# Patient Record
Sex: MALE | Race: WHITE | URBAN - METROPOLITAN AREA
[De-identification: names, ages, dates, MRNs, and addresses within clinical notes are randomized per-mention and may not be internally consistent; named-entity substitution may affect disease eponyms.]

---

## 2018-12-20 ENCOUNTER — OFFICE VISIT (OUTPATIENT)
Dept: FAMILY MEDICINE CLINIC | Age: 28
End: 2018-12-20
Payer: COMMERCIAL

## 2018-12-20 ENCOUNTER — HOSPITAL ENCOUNTER (OUTPATIENT)
Age: 28
Setting detail: SPECIMEN
Discharge: HOME | End: 2018-12-20
Payer: COMMERCIAL

## 2018-12-20 VITALS
HEIGHT: 70 IN | WEIGHT: 190 LBS | OXYGEN SATURATION: 98 % | BODY MASS INDEX: 27.2 KG/M2 | SYSTOLIC BLOOD PRESSURE: 120 MMHG | HEART RATE: 89 BPM | DIASTOLIC BLOOD PRESSURE: 98 MMHG

## 2018-12-20 DIAGNOSIS — Z13.220 SCREENING FOR LIPID DISORDERS: ICD-10-CM

## 2018-12-20 DIAGNOSIS — Z51.81 MEDICATION MONITORING ENCOUNTER: ICD-10-CM

## 2018-12-20 DIAGNOSIS — Z00.00 ROUTINE GENERAL MEDICAL EXAMINATION AT A HEALTH CARE FACILITY: ICD-10-CM

## 2018-12-20 DIAGNOSIS — Z00.00 ROUTINE GENERAL MEDICAL EXAMINATION AT A HEALTH CARE FACILITY: Primary | ICD-10-CM

## 2018-12-20 LAB
ALBUMIN SERPL-MCNC: 4.5 G/DL (ref 3.5–5.2)
ALBUMIN/GLOBULIN RATIO: 1.4 (ref 1–2.5)
ALP BLD-CCNC: 77 U/L (ref 40–129)
ALT SERPL-CCNC: 40 U/L (ref 5–41)
ANION GAP SERPL CALCULATED.3IONS-SCNC: 15 MMOL/L (ref 9–17)
AST SERPL-CCNC: 23 U/L
BILIRUB SERPL-MCNC: 0.57 MG/DL (ref 0.3–1.2)
BUN BLDV-MCNC: 16 MG/DL (ref 6–20)
BUN/CREAT BLD: NORMAL (ref 9–20)
CALCIUM SERPL-MCNC: 9.9 MG/DL (ref 8.6–10.4)
CHLORIDE BLD-SCNC: 100 MMOL/L (ref 98–107)
CHOLESTEROL/HDL RATIO: 4.5
CHOLESTEROL: 286 MG/DL
CO2: 26 MMOL/L (ref 20–31)
CREAT SERPL-MCNC: 0.98 MG/DL (ref 0.7–1.2)
GFR AFRICAN AMERICAN: >60 ML/MIN
GFR NON-AFRICAN AMERICAN: >60 ML/MIN
GFR SERPL CREATININE-BSD FRML MDRD: NORMAL ML/MIN/{1.73_M2}
GFR SERPL CREATININE-BSD FRML MDRD: NORMAL ML/MIN/{1.73_M2}
GLUCOSE BLD-MCNC: 79 MG/DL (ref 70–99)
HDLC SERPL-MCNC: 64 MG/DL
LDL CHOLESTEROL: 168 MG/DL (ref 0–130)
POTASSIUM SERPL-SCNC: 4 MMOL/L (ref 3.7–5.3)
SODIUM BLD-SCNC: 141 MMOL/L (ref 135–144)
TOTAL PROTEIN: 7.7 G/DL (ref 6.4–8.3)
TRIGL SERPL-MCNC: 272 MG/DL
VLDLC SERPL CALC-MCNC: ABNORMAL MG/DL (ref 1–30)

## 2018-12-20 PROCEDURE — 99385 PREV VISIT NEW AGE 18-39: CPT | Performed by: FAMILY MEDICINE

## 2018-12-20 PROCEDURE — 90756 CCIIV4 VACC ABX FREE IM: CPT | Performed by: FAMILY MEDICINE

## 2018-12-20 PROCEDURE — 90471 IMMUNIZATION ADMIN: CPT | Performed by: FAMILY MEDICINE

## 2018-12-20 RX ORDER — DOXYCYCLINE HYCLATE 100 MG
TABLET ORAL
Qty: 100 TABLET | Refills: 3 | Status: SHIPPED | OUTPATIENT
Start: 2018-12-20 | End: 2021-06-29

## 2018-12-20 ASSESSMENT — PATIENT HEALTH QUESTIONNAIRE - PHQ9
SUM OF ALL RESPONSES TO PHQ9 QUESTIONS 1 & 2: 0
2. FEELING DOWN, DEPRESSED OR HOPELESS: 0
SUM OF ALL RESPONSES TO PHQ QUESTIONS 1-9: 0
SUM OF ALL RESPONSES TO PHQ QUESTIONS 1-9: 0
1. LITTLE INTEREST OR PLEASURE IN DOING THINGS: 0

## 2018-12-20 ASSESSMENT — ENCOUNTER SYMPTOMS
RHINORRHEA: 0
NAUSEA: 0
PHOTOPHOBIA: 0
APNEA: 0
COLOR CHANGE: 0
EYE PAIN: 0
EYE DISCHARGE: 0
VOMITING: 0
CHEST TIGHTNESS: 0
EYE ITCHING: 0
BLOOD IN STOOL: 0
BACK PAIN: 0
SHORTNESS OF BREATH: 0
SORE THROAT: 0
STRIDOR: 0
EYE REDNESS: 0
SINUS PRESSURE: 0
ABDOMINAL DISTENTION: 0
ABDOMINAL PAIN: 0
COUGH: 0
WHEEZING: 0
DIARRHEA: 0
CHOKING: 0
CONSTIPATION: 0

## 2018-12-20 NOTE — PROGRESS NOTES
Panel; Future  - Lipid Panel; Future    2. Screening for lipid disorders  - Lipid Panel; Future    3. Medication monitoring encounter  - Comprehensive Metabolic Panel;  Future

## 2019-10-22 ENCOUNTER — IMMUNIZATION (OUTPATIENT)
Dept: FAMILY MEDICINE CLINIC | Age: 29
End: 2019-10-22
Payer: COMMERCIAL

## 2019-10-22 DIAGNOSIS — Z23 IMMUNIZATION DUE: Primary | ICD-10-CM

## 2019-10-22 PROCEDURE — 90471 IMMUNIZATION ADMIN: CPT | Performed by: NURSE PRACTITIONER

## 2019-10-22 PROCEDURE — 90688 IIV4 VACCINE SPLT 0.5 ML IM: CPT | Performed by: NURSE PRACTITIONER

## 2020-01-10 ENCOUNTER — OFFICE VISIT (OUTPATIENT)
Dept: FAMILY MEDICINE CLINIC | Age: 30
End: 2020-01-10
Payer: COMMERCIAL

## 2020-01-10 ENCOUNTER — HOSPITAL ENCOUNTER (OUTPATIENT)
Age: 30
Setting detail: SPECIMEN
Discharge: HOME | End: 2020-01-10
Payer: COMMERCIAL

## 2020-01-10 VITALS
HEIGHT: 69 IN | WEIGHT: 190.6 LBS | SYSTOLIC BLOOD PRESSURE: 126 MMHG | HEART RATE: 98 BPM | DIASTOLIC BLOOD PRESSURE: 82 MMHG | BODY MASS INDEX: 28.23 KG/M2 | OXYGEN SATURATION: 100 %

## 2020-01-10 LAB
ABSOLUTE EOS #: 0.12 K/UL (ref 0–0.44)
ABSOLUTE IMMATURE GRANULOCYTE: <0.03 K/UL (ref 0–0.3)
ABSOLUTE LYMPH #: 1.41 K/UL (ref 1.1–3.7)
ABSOLUTE MONO #: 0.41 K/UL (ref 0.1–1.2)
ALBUMIN SERPL-MCNC: 4.6 G/DL (ref 3.5–5.2)
ALT SERPL-CCNC: 40 U/L (ref 5–41)
ANION GAP SERPL CALCULATED.3IONS-SCNC: 15 MMOL/L (ref 9–17)
AST SERPL-CCNC: 22 U/L
BASOPHILS # BLD: 0 % (ref 0–2)
BASOPHILS ABSOLUTE: <0.03 K/UL (ref 0–0.2)
BUN BLDV-MCNC: 16 MG/DL (ref 6–20)
BUN/CREAT BLD: NORMAL (ref 9–20)
CALCIUM SERPL-MCNC: 10.2 MG/DL (ref 8.6–10.4)
CHLORIDE BLD-SCNC: 103 MMOL/L (ref 98–107)
CHOLESTEROL/HDL RATIO: 5
CHOLESTEROL: 289 MG/DL
CO2: 25 MMOL/L (ref 20–31)
CREAT SERPL-MCNC: 1.18 MG/DL (ref 0.7–1.2)
DIFFERENTIAL TYPE: NORMAL
EOSINOPHILS RELATIVE PERCENT: 3 % (ref 1–4)
GFR AFRICAN AMERICAN: >60 ML/MIN
GFR NON-AFRICAN AMERICAN: >60 ML/MIN
GFR SERPL CREATININE-BSD FRML MDRD: NORMAL ML/MIN/{1.73_M2}
GFR SERPL CREATININE-BSD FRML MDRD: NORMAL ML/MIN/{1.73_M2}
GLUCOSE BLD-MCNC: 73 MG/DL (ref 70–99)
HCT VFR BLD CALC: 47.9 % (ref 40.7–50.3)
HDLC SERPL-MCNC: 58 MG/DL
HEMOGLOBIN: 16 G/DL (ref 13–17)
HIV AG/AB: NONREACTIVE
IMMATURE GRANULOCYTES: 0 %
LDL CHOLESTEROL: 207 MG/DL (ref 0–130)
LYMPHOCYTES # BLD: 29 % (ref 24–43)
MAGNESIUM: 2.4 MG/DL (ref 1.6–2.6)
MCH RBC QN AUTO: 30.2 PG (ref 25.2–33.5)
MCHC RBC AUTO-ENTMCNC: 33.4 G/DL (ref 28.4–34.8)
MCV RBC AUTO: 90.4 FL (ref 82.6–102.9)
MONOCYTES # BLD: 8 % (ref 3–12)
NRBC AUTOMATED: 0 PER 100 WBC
PDW BLD-RTO: 12.6 % (ref 11.8–14.4)
PHOSPHORUS: 3.4 MG/DL (ref 2.5–4.5)
PLATELET # BLD: 219 K/UL (ref 138–453)
PLATELET ESTIMATE: NORMAL
PMV BLD AUTO: 11 FL (ref 8.1–13.5)
POTASSIUM SERPL-SCNC: 4.5 MMOL/L (ref 3.7–5.3)
RBC # BLD: 5.3 M/UL (ref 4.21–5.77)
RBC # BLD: NORMAL 10*6/UL
SEG NEUTROPHILS: 60 % (ref 36–65)
SEGMENTED NEUTROPHILS ABSOLUTE COUNT: 2.91 K/UL (ref 1.5–8.1)
SODIUM BLD-SCNC: 143 MMOL/L (ref 135–144)
TRIGL SERPL-MCNC: 122 MG/DL
TSH SERPL DL<=0.05 MIU/L-ACNC: 2.02 MIU/L (ref 0.3–5)
VLDLC SERPL CALC-MCNC: ABNORMAL MG/DL (ref 1–30)
WBC # BLD: 4.9 K/UL (ref 3.5–11.3)
WBC # BLD: NORMAL 10*3/UL

## 2020-01-10 PROCEDURE — 99395 PREV VISIT EST AGE 18-39: CPT | Performed by: FAMILY MEDICINE

## 2020-01-10 RX ORDER — OMEPRAZOLE 20 MG/1
20 CAPSULE, DELAYED RELEASE ORAL 2 TIMES DAILY
Qty: 60 CAPSULE | Refills: 2 | Status: SHIPPED | OUTPATIENT
Start: 2020-01-10 | End: 2021-06-29

## 2020-01-10 SDOH — ECONOMIC STABILITY: TRANSPORTATION INSECURITY
IN THE PAST 12 MONTHS, HAS THE LACK OF TRANSPORTATION KEPT YOU FROM MEDICAL APPOINTMENTS OR FROM GETTING MEDICATIONS?: NO

## 2020-01-10 SDOH — ECONOMIC STABILITY: FOOD INSECURITY: WITHIN THE PAST 12 MONTHS, YOU WORRIED THAT YOUR FOOD WOULD RUN OUT BEFORE YOU GOT MONEY TO BUY MORE.: NEVER TRUE

## 2020-01-10 SDOH — ECONOMIC STABILITY: INCOME INSECURITY: HOW HARD IS IT FOR YOU TO PAY FOR THE VERY BASICS LIKE FOOD, HOUSING, MEDICAL CARE, AND HEATING?: NOT HARD AT ALL

## 2020-01-10 SDOH — ECONOMIC STABILITY: FOOD INSECURITY: WITHIN THE PAST 12 MONTHS, THE FOOD YOU BOUGHT JUST DIDN'T LAST AND YOU DIDN'T HAVE MONEY TO GET MORE.: NEVER TRUE

## 2020-01-10 SDOH — ECONOMIC STABILITY: TRANSPORTATION INSECURITY
IN THE PAST 12 MONTHS, HAS LACK OF TRANSPORTATION KEPT YOU FROM MEETINGS, WORK, OR FROM GETTING THINGS NEEDED FOR DAILY LIVING?: NO

## 2020-01-10 ASSESSMENT — PATIENT HEALTH QUESTIONNAIRE - PHQ9
2. FEELING DOWN, DEPRESSED OR HOPELESS: 0
1. LITTLE INTEREST OR PLEASURE IN DOING THINGS: 0
SUM OF ALL RESPONSES TO PHQ QUESTIONS 1-9: 0
SUM OF ALL RESPONSES TO PHQ9 QUESTIONS 1 & 2: 0
SUM OF ALL RESPONSES TO PHQ QUESTIONS 1-9: 0

## 2020-01-10 NOTE — PROGRESS NOTES
Physical Examination:     GENERAL APPEARANCE: in no acute distress, well developed, well nourished. HEAD: normocephalic, atraumatic. EYES: extraocular movement intact (EOMI), pupils equal, round, reactive to light and accommodation. EARS: normal, tympanic membrane intact, clear, auditory canal clear. NOSE: nares patent, no erythema, sinuses nontender bilaterally, no rhinorrhea. ORAL CAVITY: mucosa moist, no lesions. THROAT: clear, no mass, no exudate. NECK/THYROID: neck supple, full range of motion, no thyromegaly. HEART: no murmurs, regular rate and rhythm, S1, S2 normal.   LUNGS: clear to auscultation bilaterally, no wheezes, rales, rhonchi.    ABDOMEN: normal, bowel sounds present, soft, nontender, nondistended, no rebound guarding or rigidity    Recent Labs/ In Office Testing/ Radiograph review:     Hospital Outpatient Visit on 12/20/2018   Component Date Value Ref Range Status    Glucose 12/20/2018 79  70 - 99 mg/dL Final    BUN 12/20/2018 16  6 - 20 mg/dL Final    CREATININE 12/20/2018 0.98  0.70 - 1.20 mg/dL Final    Bun/Cre Ratio 12/20/2018 NOT REPORTED  9 - 20 Final    Calcium 12/20/2018 9.9  8.6 - 10.4 mg/dL Final    Sodium 12/20/2018 141  135 - 144 mmol/L Final    Potassium 12/20/2018 4.0  3.7 - 5.3 mmol/L Final    Chloride 12/20/2018 100  98 - 107 mmol/L Final    CO2 12/20/2018 26  20 - 31 mmol/L Final    Anion Gap 12/20/2018 15  9 - 17 mmol/L Final    Alkaline Phosphatase 12/20/2018 77  40 - 129 U/L Final    ALT 12/20/2018 40  5 - 41 U/L Final    AST 12/20/2018 23  <40 U/L Final    Total Bilirubin 12/20/2018 0.57  0.3 - 1.2 mg/dL Final    Total Protein 12/20/2018 7.7  6.4 - 8.3 g/dL Final    Alb 12/20/2018 4.5  3.5 - 5.2 g/dL Final    Albumin/Globulin Ratio 12/20/2018 1.4  1.0 - 2.5 Final    GFR Non- 12/20/2018 >60  >60 mL/min Final    GFR  12/20/2018 >60  >60 mL/min Final    GFR Comment 12/20/2018        Final    Comment: Average GFR for 30-36 years old:   80 mL/min/1.73sq m  Chronic Kidney Disease:   <60 mL/min/1.73sq m  Kidney failure:   <15 mL/min/1.73sq m              eGFR calculated using average adult body mass. Additional eGFR calculator   available at:        [email or web address]            GFR Staging 12/20/2018 NOT REPORTED   Final    Cholesterol 12/20/2018 286* <200 mg/dL Final    Comment:    Cholesterol Guidelines:      <200  Desirable   200-240  Borderline      >240  Undesirable         HDL 12/20/2018 64  >40 mg/dL Final    Comment:    HDL Guidelines:    <40     Undesirable   40-59    Borderline    >59     Desirable         LDL Cholesterol 12/20/2018 168* 0 - 130 mg/dL Final    Comment:    LDL Guidelines:     <100    Desirable   100-129   Near to/above Desirable   130-159   Borderline      >159   Undesirable     Direct (measured) LDL and calculated LDL are not interchangeable tests.  Chol/HDL Ratio 12/20/2018 4.5  <5 Final            Triglycerides 12/20/2018 272* <150 mg/dL Final    Comment:    Triglyceride Guidelines:     <150   Desirable   150-199  Borderline   200-499  High     >499   Very high   Based on AHA Guidelines for fasting triglyceride, October 2012.  VLDL 12/20/2018 NOT REPORTED  1 - 30 mg/dL Final       No results found for this visit on 01/10/20. Assessment/Plan:     Cesar Azul was seen today for establish care and discuss labs. Diagnoses and all orders for this visit:    Well adult exam  -     ALT; Future  -     AST; Future  -     CBC Auto Differential; Future    BMI 27.0-27.9,adult    Exercise counseling    Dietary counseling    Multiphasic screening  -     Cancel: HIV Screen; Future    HTN, goal below 130/80  -     Magnesium; Future  -     Renal Function Panel; Future    Dyslipidemia  -     Lipid Panel; Future  -     TSH with Reflex;  Future    Hyperglycemia  -     Hemoglobin A1C; Future    Gastroesophageal reflux disease without esophagitis  -     omeprazole

## 2020-01-12 LAB
ESTIMATED AVERAGE GLUCOSE: 103 MG/DL
HBA1C MFR BLD: 5.2 % (ref 4–6)

## 2020-01-13 ENCOUNTER — TELEPHONE (OUTPATIENT)
Dept: FAMILY MEDICINE CLINIC | Age: 30
End: 2020-01-13

## 2020-01-13 LAB — H PYLORI BREATH TEST: NEGATIVE

## 2020-01-13 RX ORDER — ROSUVASTATIN CALCIUM 5 MG/1
5 TABLET, COATED ORAL NIGHTLY
Qty: 90 TABLET | Refills: 0 | Status: SHIPPED | OUTPATIENT
Start: 2020-01-13 | End: 2020-04-28 | Stop reason: SDUPTHER

## 2020-04-28 ENCOUNTER — PATIENT MESSAGE (OUTPATIENT)
Dept: FAMILY MEDICINE CLINIC | Age: 30
End: 2020-04-28

## 2020-04-28 RX ORDER — ROSUVASTATIN CALCIUM 5 MG
5 TABLET ORAL NIGHTLY
Qty: 90 TABLET | Refills: 0 | Status: SHIPPED | OUTPATIENT
Start: 2020-04-28 | End: 2020-06-03 | Stop reason: SDUPTHER

## 2020-06-03 ENCOUNTER — PATIENT MESSAGE (OUTPATIENT)
Dept: FAMILY MEDICINE CLINIC | Age: 30
End: 2020-06-03

## 2020-06-03 RX ORDER — ROSUVASTATIN CALCIUM 5 MG
5 TABLET ORAL NIGHTLY
Qty: 90 TABLET | Refills: 1 | Status: SHIPPED | OUTPATIENT
Start: 2020-06-03 | End: 2021-01-11 | Stop reason: SDUPTHER

## 2020-06-25 ENCOUNTER — HOSPITAL ENCOUNTER (OUTPATIENT)
Age: 30
Setting detail: SPECIMEN
Discharge: HOME | End: 2020-06-25
Payer: COMMERCIAL

## 2020-06-25 LAB
CHOLESTEROL/HDL RATIO: 3.2
CHOLESTEROL: 193 MG/DL
HDLC SERPL-MCNC: 60 MG/DL
LDL CHOLESTEROL: 110 MG/DL (ref 0–130)
TRIGL SERPL-MCNC: 115 MG/DL
VLDLC SERPL CALC-MCNC: NORMAL MG/DL (ref 1–30)

## 2020-09-29 ENCOUNTER — IMMUNIZATION (OUTPATIENT)
Dept: FAMILY MEDICINE CLINIC | Age: 30
End: 2020-09-29
Payer: COMMERCIAL

## 2020-09-29 PROCEDURE — 90471 IMMUNIZATION ADMIN: CPT | Performed by: FAMILY MEDICINE

## 2020-09-29 PROCEDURE — 90686 IIV4 VACC NO PRSV 0.5 ML IM: CPT | Performed by: FAMILY MEDICINE

## 2021-01-11 DIAGNOSIS — E78.5 DYSLIPIDEMIA: ICD-10-CM

## 2021-01-11 RX ORDER — ROSUVASTATIN CALCIUM 5 MG
5 TABLET ORAL NIGHTLY
Qty: 90 TABLET | Refills: 1 | Status: SHIPPED | OUTPATIENT
Start: 2021-01-11 | End: 2021-06-29 | Stop reason: SDUPTHER

## 2021-01-11 NOTE — TELEPHONE ENCOUNTER
Jak Hines is calling to request a refill on the following medication(s):    Medication Request:  Requested Prescriptions     Pending Prescriptions Disp Refills    CRESTOR 5 MG tablet 90 tablet 1     Sig: Take 1 tablet by mouth nightly       Last Visit Date (If Applicable):  6/88/6362    Next Visit Date:    Visit date not found

## 2021-03-31 ENCOUNTER — IMMUNIZATION (OUTPATIENT)
Dept: FAMILY MEDICINE CLINIC | Age: 31
End: 2021-03-31
Payer: COMMERCIAL

## 2021-03-31 PROCEDURE — 91300 COVID-19, PFIZER VACCINE 30MCG/0.3ML DOSE: CPT | Performed by: INTERNAL MEDICINE

## 2021-03-31 PROCEDURE — 0001A COVID-19, PFIZER VACCINE 30MCG/0.3ML DOSE: CPT | Performed by: INTERNAL MEDICINE

## 2021-04-21 ENCOUNTER — IMMUNIZATION (OUTPATIENT)
Dept: FAMILY MEDICINE CLINIC | Age: 31
End: 2021-04-21
Payer: COMMERCIAL

## 2021-04-21 PROCEDURE — 91300 COVID-19, PFIZER VACCINE 30MCG/0.3ML DOSE: CPT | Performed by: INTERNAL MEDICINE

## 2021-04-21 PROCEDURE — 0002A COVID-19, PFIZER VACCINE 30MCG/0.3ML DOSE: CPT | Performed by: INTERNAL MEDICINE

## 2021-06-29 ENCOUNTER — OFFICE VISIT (OUTPATIENT)
Dept: FAMILY MEDICINE CLINIC | Age: 31
End: 2021-06-29
Payer: COMMERCIAL

## 2021-06-29 VITALS
TEMPERATURE: 97.4 F | BODY MASS INDEX: 27.16 KG/M2 | HEART RATE: 91 BPM | OXYGEN SATURATION: 98 % | SYSTOLIC BLOOD PRESSURE: 110 MMHG | HEIGHT: 71 IN | DIASTOLIC BLOOD PRESSURE: 70 MMHG | WEIGHT: 194 LBS

## 2021-06-29 DIAGNOSIS — E78.5 DYSLIPIDEMIA: ICD-10-CM

## 2021-06-29 DIAGNOSIS — Z71.3 DIETARY COUNSELING: ICD-10-CM

## 2021-06-29 DIAGNOSIS — R73.9 HYPERGLYCEMIA: ICD-10-CM

## 2021-06-29 DIAGNOSIS — Z00.00 WELL ADULT EXAM: Primary | ICD-10-CM

## 2021-06-29 DIAGNOSIS — Z71.82 EXERCISE COUNSELING: ICD-10-CM

## 2021-06-29 PROCEDURE — 99395 PREV VISIT EST AGE 18-39: CPT | Performed by: FAMILY MEDICINE

## 2021-06-29 PROCEDURE — G8417 CALC BMI ABV UP PARAM F/U: HCPCS | Performed by: FAMILY MEDICINE

## 2021-06-29 RX ORDER — ROSUVASTATIN CALCIUM 5 MG
5 TABLET ORAL NIGHTLY
Qty: 90 TABLET | Refills: 3 | Status: SHIPPED | OUTPATIENT
Start: 2021-06-29 | End: 2021-07-28 | Stop reason: SDUPTHER

## 2021-06-29 ASSESSMENT — PATIENT HEALTH QUESTIONNAIRE - PHQ9
SUM OF ALL RESPONSES TO PHQ QUESTIONS 1-9: 0
SUM OF ALL RESPONSES TO PHQ QUESTIONS 1-9: 0
SUM OF ALL RESPONSES TO PHQ9 QUESTIONS 1 & 2: 0
1. LITTLE INTEREST OR PLEASURE IN DOING THINGS: 0
SUM OF ALL RESPONSES TO PHQ QUESTIONS 1-9: 0
2. FEELING DOWN, DEPRESSED OR HOPELESS: 0

## 2021-06-29 NOTE — PATIENT INSTRUCTIONS
Learning About Obesity  What is obesity? Obesity means having an unhealthy amount of body fat. This puts your health in danger. It can lead to other health problems, such as type 2 diabetes and high blood pressure. How do you know if your weight is in the obesity range? To know if your weight is in the obesity range, your doctor looks at your body mass index (BMI) and waist size. BMI is a number that is calculated from your weight and your height. To figure out your BMI for yourself, you can use an online tool, such as http://www.ace.com/ on the [employer]. If your BMI is 30.0 or higher, it falls within the obesity range. Keep in mind that BMI and waist size are only guides. They are not tools to determine your ideal body weight. What causes obesity? When you take in more calories than you burn off, you gain weight. How you eat, how active you are, and other things affect how your body uses calories and whether you gain weight. If you have family members who have too much body fat, you may have inherited a tendency to gain weight. And your family also helps form your eating and lifestyle habits, which can lead to obesity. Also, our busy lives make it harder to plan and cook healthy meals. For many of us, it's easier to reach for prepared foods, go out to eat, or go to the drive-through. But these foods are often high in saturated fat and calories. Portions are often too large. What can you do to reach a healthy weight? Focus on health, not diets. Diets are hard to stay on and don't work in the long run. It is very hard to stay with a diet that includes lots of big changes in your eating habits. Instead of a diet, focus on lifestyle changes that will improve your health and achieve the right balance of energy and calories. To lose weight, you need to burn more calories than you take in.  You can do it by eating healthy foods in reasonable amounts and becoming more active, even a little bit every day. Making small changes over time can add up to a lot. Make a plan for change. Many people have found that naming their reasons for change and staying focused on their plan can make a big difference. Work with your doctor to create a plan that is right for you. · Ask yourself: Belle Sarkar are my personal, most powerful reasons for wanting this change? What will my life look like when I've made the change? \"  · Set your long-term goal. Make it specific, such as \"I will lose x pounds. \"  · Break your long-term goal into smaller, short-term goals. Make these small steps specific and within your reach, things you know you can do. These steps are what keep you going from day to day. Talk with your doctor about other weight-loss options. If you have a BMI in a certain range and have not been able to lose weight with diet and exercise, medicine or surgery may be an option for you. Before your doctor will prescribe medicines or surgery, he or she will probably want you to be more active and follow your healthy eating plan for a period of time. These habits are key lifelong changes for managing your weight, with or without other medical treatment. And these changes can help you avoid weight-related health problems. How can you stay on your plan for change? Be ready. Choose to start during a time when there are few events like holidays, social events, and high-stress periods. These events might trigger slip-ups. Decide on your first few steps. Most people have more success when they make small changes, one step at a time. For example, you might switch a daily candy bar to a piece of fruit, walk 10 minutes more, or add more vegetables to a meal.  Line up your support people. Make sure you're not going to be alone as you make this change. Connect with people who understand how important it is to you.  Ask family members and friends for help in keeping

## 2021-06-29 NOTE — PROGRESS NOTES
clear.   NOSE: nares patent, no erythema, sinuses nontender bilaterally, no rhinorrhea. ORAL CAVITY: mucosa moist, no lesions. THROAT: clear, no mass, no exudate. NECK/THYROID: neck supple, full range of motion, no thyromegaly. HEART: no murmurs, regular rate and rhythm, S1, S2 normal.   LUNGS: clear to auscultation bilaterally, no wheezes, rales, rhonchi. ABDOMEN: normal, bowel sounds present, soft, nontender, nondistended, no rebound guarding or rigidity    Recent Labs/ In Office Testing/ Radiograph review:     Hospital Outpatient Visit on 06/25/2020   Component Date Value Ref Range Status    Cholesterol 06/25/2020 193  <200 mg/dL Final    Comment:    Cholesterol Guidelines:      <200  Desirable   200-240  Borderline      >240  Undesirable         HDL 06/25/2020 60  >40 mg/dL Final    Comment:    HDL Guidelines:    <40     Undesirable   40-59    Borderline    >59     Desirable         LDL Cholesterol 06/25/2020 110  0 - 130 mg/dL Final    Comment:    LDL Guidelines:     <100    Desirable   100-129   Near to/above Desirable   130-159   Borderline      >159   Undesirable     Direct (measured) LDL and calculated LDL are not interchangeable tests.  Chol/HDL Ratio 06/25/2020 3.2  <5 Final            Triglycerides 06/25/2020 115  <150 mg/dL Final    Comment:    Triglyceride Guidelines:     <150   Desirable   150-199  Borderline   200-499  High     >499   Very high   Based on AHA Guidelines for fasting triglyceride, October 2012.  VLDL 06/25/2020 NOT REPORTED  1 - 30 mg/dL Final       No results found for this visit on 06/29/21. Assessment/Plan:     Grey Salas was seen today for annual exam.    Diagnoses and all orders for this visit:    Well adult exam    BMI 27.0-27.9,adult    Exercise counseling    Dietary counseling  -      BMI ABOVE NORMAL F/U    Dyslipidemia  -     ALT; Future  -     AST; Future  -     CBC Auto Differential; Future  -     Lipid Panel;  Future  -     Magnesium; Future  -     Renal Function Panel; Future  -     TSH with Reflex; Future  -     CRESTOR 5 MG tablet; Take 1 tablet by mouth nightly    Hyperglycemia  -     Hemoglobin A1C; Future    Follow up on labs. Encouraged well balanced diet. Encouraged 150 mins of aerobic activity weekly. Refill as above. All questions answered and addressed to patient satisfaction. Patient understands and agrees to the plan. The patient was evaluated and treated today based on the osteopathic principle that each person is a unit of body, mind, and spirit, the body is capable of self-regulation, self-healing, and health maintenance and that structure and function are reciprocally interrelated. Follow-up:   Return in about 1 year (around 6/29/2022) for CPE; 40 min appt. Deysi Moreland D.O.    BMI was elevated today, and weight loss plan recommended is : conventional weight loss.

## 2021-06-30 ENCOUNTER — HOSPITAL ENCOUNTER (OUTPATIENT)
Age: 31
Setting detail: SPECIMEN
Discharge: HOME | End: 2021-06-30
Payer: COMMERCIAL

## 2021-06-30 DIAGNOSIS — E78.5 DYSLIPIDEMIA: ICD-10-CM

## 2021-06-30 DIAGNOSIS — R73.9 HYPERGLYCEMIA: ICD-10-CM

## 2021-06-30 LAB
ABSOLUTE EOS #: 0.15 K/UL (ref 0–0.44)
ABSOLUTE IMMATURE GRANULOCYTE: <0.03 K/UL (ref 0–0.3)
ABSOLUTE LYMPH #: 1.47 K/UL (ref 1.1–3.7)
ABSOLUTE MONO #: 0.51 K/UL (ref 0.1–1.2)
ALBUMIN SERPL-MCNC: 4.5 G/DL (ref 3.5–5.2)
ALT SERPL-CCNC: 31 U/L (ref 5–41)
ANION GAP SERPL CALCULATED.3IONS-SCNC: 10 MMOL/L (ref 9–17)
AST SERPL-CCNC: 25 U/L
BASOPHILS # BLD: 0 % (ref 0–2)
BASOPHILS ABSOLUTE: <0.03 K/UL (ref 0–0.2)
BUN BLDV-MCNC: 15 MG/DL (ref 6–20)
BUN/CREAT BLD: NORMAL (ref 9–20)
CALCIUM SERPL-MCNC: 9.7 MG/DL (ref 8.6–10.4)
CHLORIDE BLD-SCNC: 103 MMOL/L (ref 98–107)
CHOLESTEROL/HDL RATIO: 3.6
CHOLESTEROL: 211 MG/DL
CO2: 26 MMOL/L (ref 20–31)
CREAT SERPL-MCNC: 0.97 MG/DL (ref 0.7–1.2)
DIFFERENTIAL TYPE: NORMAL
EOSINOPHILS RELATIVE PERCENT: 3 % (ref 1–4)
ESTIMATED AVERAGE GLUCOSE: 103 MG/DL
GFR AFRICAN AMERICAN: >60 ML/MIN
GFR NON-AFRICAN AMERICAN: >60 ML/MIN
GFR SERPL CREATININE-BSD FRML MDRD: NORMAL ML/MIN/{1.73_M2}
GFR SERPL CREATININE-BSD FRML MDRD: NORMAL ML/MIN/{1.73_M2}
GLUCOSE BLD-MCNC: 88 MG/DL (ref 70–99)
HBA1C MFR BLD: 5.2 % (ref 4–6)
HCT VFR BLD CALC: 48.5 % (ref 40.7–50.3)
HDLC SERPL-MCNC: 58 MG/DL
HEMOGLOBIN: 15.4 G/DL (ref 13–17)
IMMATURE GRANULOCYTES: 0 %
LDL CHOLESTEROL: 124 MG/DL (ref 0–130)
LYMPHOCYTES # BLD: 30 % (ref 24–43)
MAGNESIUM: 2.5 MG/DL (ref 1.6–2.6)
MCH RBC QN AUTO: 28.3 PG (ref 25.2–33.5)
MCHC RBC AUTO-ENTMCNC: 31.8 G/DL (ref 28.4–34.8)
MCV RBC AUTO: 89.2 FL (ref 82.6–102.9)
MONOCYTES # BLD: 10 % (ref 3–12)
NRBC AUTOMATED: 0 PER 100 WBC
PDW BLD-RTO: 12.7 % (ref 11.8–14.4)
PHOSPHORUS: 3.7 MG/DL (ref 2.5–4.5)
PLATELET # BLD: 217 K/UL (ref 138–453)
PLATELET ESTIMATE: NORMAL
PMV BLD AUTO: 10.5 FL (ref 8.1–13.5)
POTASSIUM SERPL-SCNC: 4.7 MMOL/L (ref 3.7–5.3)
RBC # BLD: 5.44 M/UL (ref 4.21–5.77)
RBC # BLD: NORMAL 10*6/UL
SEG NEUTROPHILS: 57 % (ref 36–65)
SEGMENTED NEUTROPHILS ABSOLUTE COUNT: 2.8 K/UL (ref 1.5–8.1)
SODIUM BLD-SCNC: 139 MMOL/L (ref 135–144)
TRIGL SERPL-MCNC: 147 MG/DL
TSH SERPL DL<=0.05 MIU/L-ACNC: 2.23 MIU/L (ref 0.3–5)
VLDLC SERPL CALC-MCNC: ABNORMAL MG/DL (ref 1–30)
WBC # BLD: 5 K/UL (ref 3.5–11.3)
WBC # BLD: NORMAL 10*3/UL

## 2021-07-28 ENCOUNTER — PATIENT MESSAGE (OUTPATIENT)
Dept: FAMILY MEDICINE CLINIC | Age: 31
End: 2021-07-28

## 2021-07-28 DIAGNOSIS — E78.5 DYSLIPIDEMIA: ICD-10-CM

## 2021-07-28 RX ORDER — ROSUVASTATIN CALCIUM 5 MG
5 TABLET ORAL NIGHTLY
Qty: 90 TABLET | Refills: 3 | Status: SHIPPED | OUTPATIENT
Start: 2021-07-28 | End: 2022-06-28 | Stop reason: SDUPTHER

## 2021-07-28 NOTE — TELEPHONE ENCOUNTER
From: Travis Christopher  To: Dolores Vigil, DO  Sent: 7/28/2021 9:47 AM EDT  Subject: Prescription Question    Orquidea Watters to [facility] to  my 30-day Crestor 5 mg. They tried making me pay $274.53 which has never been the case, usually has been $2.93. I called [facility], they want me to do 90-day mail order which will actually cost me nothing. The only problem is I have 3 pills left and I will be heading out of town from Thursday-Monday so I will miss my nightly doses Sat-Sun. Was wondering if you could call the Crestor into [facility] as well as hear your suggestion on what I should do for these two days I won't have the pills.    Thanks,   Raymond Roth

## 2021-11-10 ENCOUNTER — IMMUNIZATION (OUTPATIENT)
Dept: FAMILY MEDICINE CLINIC | Age: 31
End: 2021-11-10
Payer: COMMERCIAL

## 2021-11-10 PROCEDURE — 0004A COVID-19, PFIZER VACCINE 30MCG/0.3ML DOSE: CPT | Performed by: INTERNAL MEDICINE

## 2021-11-10 PROCEDURE — 91300 COVID-19, PFIZER VACCINE 30MCG/0.3ML DOSE: CPT | Performed by: INTERNAL MEDICINE

## 2021-12-03 ENCOUNTER — IMMUNIZATION (OUTPATIENT)
Dept: FAMILY MEDICINE CLINIC | Age: 31
End: 2021-12-03
Payer: COMMERCIAL

## 2021-12-03 PROCEDURE — 90674 CCIIV4 VAC NO PRSV 0.5 ML IM: CPT | Performed by: FAMILY MEDICINE

## 2021-12-03 PROCEDURE — 90471 IMMUNIZATION ADMIN: CPT | Performed by: FAMILY MEDICINE

## 2022-04-13 DIAGNOSIS — K21.9 GASTROESOPHAGEAL REFLUX DISEASE WITHOUT ESOPHAGITIS: Primary | ICD-10-CM

## 2022-04-13 RX ORDER — ESOMEPRAZOLE MAGNESIUM 20 MG
20 CAPSULE,DELAYED RELEASE (ENTERIC COATED) ORAL
Qty: 90 CAPSULE | Refills: 0 | Status: SHIPPED | OUTPATIENT
Start: 2022-04-13 | End: 2022-06-28 | Stop reason: SDUPTHER

## 2022-06-28 ENCOUNTER — OFFICE VISIT (OUTPATIENT)
Dept: FAMILY MEDICINE CLINIC | Age: 32
End: 2022-06-28
Payer: COMMERCIAL

## 2022-06-28 ENCOUNTER — HOSPITAL ENCOUNTER (OUTPATIENT)
Age: 32
Setting detail: SPECIMEN
Discharge: HOME | End: 2022-06-28

## 2022-06-28 VITALS
DIASTOLIC BLOOD PRESSURE: 82 MMHG | BODY MASS INDEX: 28.57 KG/M2 | WEIGHT: 202 LBS | OXYGEN SATURATION: 99 % | SYSTOLIC BLOOD PRESSURE: 132 MMHG | HEART RATE: 79 BPM

## 2022-06-28 DIAGNOSIS — E78.5 DYSLIPIDEMIA: ICD-10-CM

## 2022-06-28 DIAGNOSIS — H81.12 BPPV (BENIGN PAROXYSMAL POSITIONAL VERTIGO), LEFT: ICD-10-CM

## 2022-06-28 DIAGNOSIS — R73.9 HYPERGLYCEMIA: ICD-10-CM

## 2022-06-28 DIAGNOSIS — Z71.3 DIETARY COUNSELING: ICD-10-CM

## 2022-06-28 DIAGNOSIS — Z71.82 EXERCISE COUNSELING: ICD-10-CM

## 2022-06-28 DIAGNOSIS — K21.9 GASTROESOPHAGEAL REFLUX DISEASE WITHOUT ESOPHAGITIS: ICD-10-CM

## 2022-06-28 DIAGNOSIS — Z00.00 WELL ADULT EXAM: Primary | ICD-10-CM

## 2022-06-28 LAB
ABSOLUTE EOS #: 0.13 K/UL (ref 0–0.44)
ABSOLUTE IMMATURE GRANULOCYTE: <0.03 K/UL (ref 0–0.3)
ABSOLUTE LYMPH #: 1.65 K/UL (ref 1.1–3.7)
ABSOLUTE MONO #: 0.49 K/UL (ref 0.1–1.2)
ALBUMIN SERPL-MCNC: 4.8 G/DL (ref 3.5–5.2)
ALT SERPL-CCNC: 41 U/L (ref 5–41)
ANION GAP SERPL CALCULATED.3IONS-SCNC: 16 MMOL/L (ref 9–17)
AST SERPL-CCNC: 27 U/L
BASOPHILS # BLD: 0 % (ref 0–2)
BASOPHILS ABSOLUTE: <0.03 K/UL (ref 0–0.2)
BUN BLDV-MCNC: 15 MG/DL (ref 6–20)
CALCIUM SERPL-MCNC: 10 MG/DL (ref 8.6–10.4)
CHLORIDE BLD-SCNC: 100 MMOL/L (ref 98–107)
CHOLESTEROL/HDL RATIO: 3.7
CHOLESTEROL: 230 MG/DL
CO2: 25 MMOL/L (ref 20–31)
CREAT SERPL-MCNC: 0.93 MG/DL (ref 0.7–1.2)
EOSINOPHILS RELATIVE PERCENT: 2 % (ref 1–4)
GFR AFRICAN AMERICAN: >60 ML/MIN
GFR NON-AFRICAN AMERICAN: >60 ML/MIN
GFR SERPL CREATININE-BSD FRML MDRD: NORMAL ML/MIN/{1.73_M2}
GLUCOSE BLD-MCNC: 78 MG/DL (ref 70–99)
HCT VFR BLD CALC: 47.1 % (ref 40.7–50.3)
HDLC SERPL-MCNC: 62 MG/DL
HEMOGLOBIN: 15.4 G/DL (ref 13–17)
IMMATURE GRANULOCYTES: 0 %
LDL CHOLESTEROL: 121 MG/DL (ref 0–130)
LYMPHOCYTES # BLD: 27 % (ref 24–43)
MAGNESIUM: 2.1 MG/DL (ref 1.6–2.6)
MCH RBC QN AUTO: 28.5 PG (ref 25.2–33.5)
MCHC RBC AUTO-ENTMCNC: 32.7 G/DL (ref 28.4–34.8)
MCV RBC AUTO: 87.2 FL (ref 82.6–102.9)
MONOCYTES # BLD: 8 % (ref 3–12)
NRBC AUTOMATED: 0 PER 100 WBC
PDW BLD-RTO: 12.2 % (ref 11.8–14.4)
PHOSPHORUS: 3.6 MG/DL (ref 2.5–4.5)
PLATELET # BLD: 221 K/UL (ref 138–453)
PMV BLD AUTO: 10.5 FL (ref 8.1–13.5)
POTASSIUM SERPL-SCNC: 4.3 MMOL/L (ref 3.7–5.3)
RBC # BLD: 5.4 M/UL (ref 4.21–5.77)
SEG NEUTROPHILS: 63 % (ref 36–65)
SEGMENTED NEUTROPHILS ABSOLUTE COUNT: 3.75 K/UL (ref 1.5–8.1)
SODIUM BLD-SCNC: 141 MMOL/L (ref 135–144)
TRIGL SERPL-MCNC: 234 MG/DL
TSH SERPL DL<=0.05 MIU/L-ACNC: 1.91 UIU/ML (ref 0.3–5)
WBC # BLD: 6.1 K/UL (ref 3.5–11.3)

## 2022-06-28 PROCEDURE — G8417 CALC BMI ABV UP PARAM F/U: HCPCS | Performed by: FAMILY MEDICINE

## 2022-06-28 PROCEDURE — 99395 PREV VISIT EST AGE 18-39: CPT | Performed by: FAMILY MEDICINE

## 2022-06-28 RX ORDER — ROSUVASTATIN CALCIUM 5 MG
5 TABLET ORAL NIGHTLY
Qty: 90 TABLET | Refills: 3 | Status: SHIPPED | OUTPATIENT
Start: 2022-06-28

## 2022-06-28 RX ORDER — ESOMEPRAZOLE MAGNESIUM 20 MG
20 CAPSULE,DELAYED RELEASE (ENTERIC COATED) ORAL
Qty: 90 CAPSULE | Refills: 3 | Status: SHIPPED | OUTPATIENT
Start: 2022-06-28

## 2022-06-28 SDOH — ECONOMIC STABILITY: FOOD INSECURITY: WITHIN THE PAST 12 MONTHS, THE FOOD YOU BOUGHT JUST DIDN'T LAST AND YOU DIDN'T HAVE MONEY TO GET MORE.: NEVER TRUE

## 2022-06-28 SDOH — ECONOMIC STABILITY: FOOD INSECURITY: WITHIN THE PAST 12 MONTHS, YOU WORRIED THAT YOUR FOOD WOULD RUN OUT BEFORE YOU GOT MONEY TO BUY MORE.: NEVER TRUE

## 2022-06-28 ASSESSMENT — PATIENT HEALTH QUESTIONNAIRE - PHQ9
1. LITTLE INTEREST OR PLEASURE IN DOING THINGS: 0
SUM OF ALL RESPONSES TO PHQ QUESTIONS 1-9: 0
2. FEELING DOWN, DEPRESSED OR HOPELESS: 0
SUM OF ALL RESPONSES TO PHQ9 QUESTIONS 1 & 2: 0
SUM OF ALL RESPONSES TO PHQ QUESTIONS 1-9: 0

## 2022-06-28 ASSESSMENT — SOCIAL DETERMINANTS OF HEALTH (SDOH): HOW HARD IS IT FOR YOU TO PAY FOR THE VERY BASICS LIKE FOOD, HOUSING, MEDICAL CARE, AND HEATING?: NOT HARD AT ALL

## 2022-06-28 NOTE — PROGRESS NOTES
Progress Note    Osmar Lopez is a 33 y.o.  male who presents today alone for evaluation of   Chief Complaint   Patient presents with    Annual Exam           HPI:   Patient is here for CPE today. Patient PMH acne vulgaris & dyslipidemia. Patient denies PSH. Patient fhx dad HTN and dyslipidemia. Patient denies tobacco products. Patient denies regular EtOH use. Patient denies illicits. Patient denies SIG E CAPS. Patient denies SI/HI. Patient denies anxiety. Patient denies specific diet. Patient does exercise aerobically and with weights 3-4 times per week. Patient needs refills of his crestor and nexium today. Patient requests referral to PT for vestibular rehab for BPPV today. PHQ-9 Total Score: 0 (6/28/2022  2:34 PM)    Health Maintenance Due   Topic Date Due    Varicella vaccine (1 of 2 - 2-dose childhood series) Never done    Hepatitis C screen  Never done    Hepatitis B vaccine (3 of 3 - 3-dose primary series) 09/25/2007    DTaP/Tdap/Td vaccine (1 - Tdap) Never done    Depression Screen  06/29/2022    Lipids  06/30/2022        Current Medications:     Current Outpatient Medications   Medication Sig Dispense Refill    CRESTOR 5 MG tablet Take 1 tablet by mouth nightly 90 tablet 3    NEXIUM 20 MG delayed release capsule Take 1 capsule by mouth every morning (before breakfast) 90 capsule 3    Multiple Vitamins-Minerals (MULTIVITAL PO) Take by mouth      VITAMIN D PO Take by mouth       No current facility-administered medications for this visit. Allergies:   No Known Allergies     Medical History:     Past Medical History:   Diagnosis Date    Acne     Dyslipidemia        History reviewed. No pertinent surgical history.     Family History   Problem Relation Age of Onset    High Blood Pressure Father     High Cholesterol Father         Social History:     Social History     Socioeconomic History    Marital status: Single     Spouse name: Not on file    Number of children: Not on file    Years of education: Not on file    Highest education level: Not on file   Occupational History    Not on file   Tobacco Use    Smoking status: Never Smoker    Smokeless tobacco: Former User     Types: Chew   Vaping Use    Vaping Use: Never used   Substance and Sexual Activity    Alcohol use: Yes    Drug use: No    Sexual activity: Yes     Partners: Female   Other Topics Concern    Not on file   Social History Narrative    Not on file     Social Determinants of Health     Financial Resource Strain: Low Risk     Difficulty of Paying Living Expenses: Not hard at all   Food Insecurity: No Food Insecurity    Worried About Running Out of Food in the Last Year: Never true    [address] in the Last Year: Never true   Transportation Needs:     Lack of Transportation (Medical): Not on file    Lack of Transportation (Non-Medical): Not on file   Physical Activity:     Days of Exercise per Week: Not on file    Minutes of Exercise per Session: Not on file   Stress:     Feeling of Stress : Not on file   Social Connections:     Frequency of Communication with Friends and Family: Not on file    Frequency of Social Gatherings with Friends and Family: Not on file    Attends Mu-ism Services: Not on file    Active Member of [address] [place] [address] or Organizations: Not on file    Attends Club or Organization Meetings: Not on file    Marital Status: Not on file   Intimate Partner Violence:     Fear of Current or Ex-Partner: Not on file    Emotionally Abused: Not on file    Physically Abused: Not on file    Sexually Abused: Not on file   Housing Stability:     Unable to Pay for Housing in the Last Year: Not on file    Number of [place] in the Last Year: Not on file    Unstable Housing in the Last Year: Not on file        ROS:     Constitutional: No fevers, chills, fatigue. ENT: No nasal congestion or sore throat  Respiratory: No difficulty in breathing or cough.    Cardiovascular: No chest pain, palpitations or shortness of breath  Gastrointestinal: No abdominal pain or change in bowel movements. Genitourinary: No change in urinary frequency or dysuria. Skin: No rashes or skin lesions. Neurological: No weakness. No headaches. Last Filed Vitals:  /82   Pulse 79   Wt 202 lb (91.6 kg)   SpO2 99%   BMI 28.57 kg/m²      Physical Examination:     GENERAL APPEARANCE: in no acute distress, well developed, well nourished. HEAD: normocephalic, atraumatic. EYES: extraocular movement intact (EOMI), pupils equal, round, reactive to light and accommodation. EARS: normal, tympanic membrane intact, clear, auditory canal clear. NOSE: nares patent, no erythema, sinuses nontender bilaterally, no rhinorrhea. ORAL CAVITY: mucosa moist, no lesions. THROAT: clear, no mass, no exudate. NECK/THYROID: neck supple, full range of motion, no thyromegaly. HEART: no murmurs, regular rate and rhythm, S1, S2 normal.   LUNGS: clear to auscultation bilaterally, no wheezes, rales, rhonchi.    ABDOMEN: normal, bowel sounds present, soft, nontender, nondistended, no rebound guarding or rigidity    Recent Labs/ In Office Testing/ Radiograph review:     Hospital Outpatient Visit on 06/30/2021   Component Date Value Ref Range Status    ALT 06/30/2021 31  5 - 41 U/L Final    AST 06/30/2021 25  <40 U/L Final    WBC 06/30/2021 5.0  3.5 - 11.3 k/uL Final    RBC 06/30/2021 5.44  4.21 - 5.77 m/uL Final    Hemoglobin 06/30/2021 15.4  13.0 - 17.0 g/dL Final    Hematocrit 06/30/2021 48.5  40.7 - 50.3 % Final    MCV 06/30/2021 89.2  82.6 - 102.9 fL Final    MCH 06/30/2021 28.3  25.2 - 33.5 pg Final    MCHC 06/30/2021 31.8  28.4 - 34.8 g/dL Final    RDW 06/30/2021 12.7  11.8 - 14.4 % Final    Platelets 40/82/5230 217  138 - 453 k/uL Final    MPV 06/30/2021 10.5  8.1 - 13.5 fL Final    NRBC Automated 06/30/2021 0.0  0.0 per 100 WBC Final    Differential Type 06/30/2021 NOT REPORTED   Final    Seg Neutrophils 06/30/2021 57  36 - 65 % Final    Lymphocytes 06/30/2021 30  24 - 43 % Final    Monocytes 06/30/2021 10  3 - 12 % Final    Eosinophils % 06/30/2021 3  1 - 4 % Final    Basophils 06/30/2021 0  0 - 2 % Final    Immature Granulocytes 06/30/2021 0  0 % Final    Segs Absolute 06/30/2021 2.80  1.50 - 8.10 k/uL Final    Absolute Lymph # 06/30/2021 1.47  1.10 - 3.70 k/uL Final    Absolute Mono # 06/30/2021 0.51  0.10 - 1.20 k/uL Final    Absolute Eos # 06/30/2021 0.15  0.00 - 0.44 k/uL Final    Basophils Absolute 06/30/2021 <0.03  0.00 - 0.20 k/uL Final    Absolute Immature Granulocyte 06/30/2021 <0.03  0.00 - 0.30 k/uL Final    WBC Morphology 06/30/2021 NOT REPORTED   Final    RBC Morphology 06/30/2021 NOT REPORTED   Final    Platelet Estimate 76/90/6235 NOT REPORTED   Final    Hemoglobin A1C 06/30/2021 5.2  4.0 - 6.0 % Final    Estimated Avg Glucose 06/30/2021 103  mg/dL Final    Comment: The ADA and AACC recommend providing the estimated average glucose result to permit better   patient understanding of their HBA1c result.  Cholesterol 06/30/2021 211* <200 mg/dL Final    Comment:    Cholesterol Guidelines:      <200  Desirable   200-240  Borderline      >240  Undesirable         HDL 06/30/2021 58  >40 mg/dL Final    Comment:    HDL Guidelines:    <40     Undesirable   40-59    Borderline    >59     Desirable         LDL Cholesterol 06/30/2021 124  0 - 130 mg/dL Final    Comment:    LDL Guidelines:     <100    Desirable   100-129   Near to/above Desirable   130-159   Borderline      >159   Undesirable     Direct (measured) LDL and calculated LDL are not interchangeable tests.  Chol/HDL Ratio 06/30/2021 3.6  <5 Final            Triglycerides 06/30/2021 147  <150 mg/dL Final    Comment:    Triglyceride Guidelines:     <150   Desirable   150-199  Borderline   200-499  High     >499   Very high   Based on AHA Guidelines for fasting triglyceride, October 2012.          VLDL 06/30/2021 NOT REPORTED 1 - 30 mg/dL Final    Magnesium 06/30/2021 2.5  1.6 - 2.6 mg/dL Final    Glucose 06/30/2021 88  70 - 99 mg/dL Final    BUN 06/30/2021 15  6 - 20 mg/dL Final    CREATININE 06/30/2021 0.97  0.70 - 1.20 mg/dL Final    Bun/Cre Ratio 06/30/2021 NOT REPORTED  9 - 20 Final    Calcium 06/30/2021 9.7  8.6 - 10.4 mg/dL Final    Albumin 06/30/2021 4.5  3.5 - 5.2 g/dL Final    Phosphorus 06/30/2021 3.7  2.5 - 4.5 mg/dL Final    Sodium 06/30/2021 139  135 - 144 mmol/L Final    Potassium 06/30/2021 4.7  3.7 - 5.3 mmol/L Final    Chloride 06/30/2021 103  98 - 107 mmol/L Final    CO2 06/30/2021 26  20 - 31 mmol/L Final    Anion Gap 06/30/2021 10  9 - 17 mmol/L Final    GFR Non- 06/30/2021 >60  >60 mL/min Final    GFR  06/30/2021 >60  >60 mL/min Final    GFR Comment 06/30/2021        Final    Comment: Average GFR for 30-36 years old:   80 mL/min/1.73sq m  Chronic Kidney Disease:   <60 mL/min/1.73sq m  Kidney failure:   <15 mL/min/1.73sq m              eGFR calculated using average adult body mass. Additional eGFR calculator available at:        [email or web address]            GFR Staging 06/30/2021 NOT REPORTED   Final    TSH 06/30/2021 2.23  0.30 - 5.00 mIU/L Final       No results found for this visit on 06/28/22. Assessment/Plan:     Chico Boyle was seen today for annual exam.    Diagnoses and all orders for this visit:    Well adult exam    BMI 28.0-28.9,adult    Exercise counseling    Dietary counseling  -      BMI ABOVE NORMAL F/U    Dyslipidemia  -     ALT; Future  -     AST; Future  -     CBC with Auto Differential; Future  -     Lipid Panel; Future  -     Magnesium; Future  -     Renal Function Panel; Future  -     TSH with Reflex; Future  -     CRESTOR 5 MG tablet;  Take 1 tablet by mouth nightly    Hyperglycemia  -     Hemoglobin A1C; Future    BPPV (benign paroxysmal positional vertigo), left  -     [facility] [place]    Gastroesophageal reflux disease without esophagitis  -     NEXIUM 20 MG delayed release capsule; Take 1 capsule by mouth every morning (before breakfast)    Follow up on labs. Encouraged well balanced diet. Encouraged 150 mins of aerobic activity weekly. Refills as above. Referral as above. All questions answered and addressed to patient satisfaction. Patient understands and agrees to the plan. The patient was evaluated and treated today based on the osteopathic principle that each person is a unit of body, mind, and spirit, the body is capable of self-regulation, self-healing, and health maintenance and that structure and function are reciprocally interrelated. Follow-up:   Return if symptoms worsen or fail to improve. Brittany Broussard D.O.    BMI was elevated today, and weight loss plan recommended is : conventional weight loss.

## 2022-06-28 NOTE — PATIENT INSTRUCTIONS
Learning About Obesity  What is obesity? Obesity means having an unhealthy amount of body fat. This puts your health in danger. It can lead to other health problems, such as type 2 diabetes and highblood pressure. How do you know if your weight is in the obesity range? To know if your weight is in the obesity range, your doctor looks at your bodymass index (BMI) and waist size. BMI is a number that is calculated from your weight and your height. To figure out your BMI for yourself, you can use an online tool, such as http://www.[employer]/ on [employer] - is now [employer]. If your BMI is 30.0 or higher, it falls within the obesity range. Keep in mind that BMI and waist size are only guides. They are not tools to determine yourideal body weight. What causes obesity? When you take in more calories than you burn off, you gain weight. How you eat, how active you are, and other things affect how your body uses calories andwhether you gain weight. If you have family members who have too much body fat, you may have inherited a tendency to gain weight. And your family also helps form your eating andlifestyle habits, which can lead to obesity. Also, our busy lives make it harder to plan and cook healthy meals. For many of us, it's easier to reach for prepared foods, go out to eat, or go to the drive-through. But these foods are often high in saturated fat and calories. Portions are often too large. What can you do to reach a healthy weight? Focus on health, not diets. Diets are hard to stay on and don't work in the long run. It is very hard tostay with a diet that includes lots of big changes in your eating habits. Instead of a diet, focus on lifestyle changes that will improve your health and achieve the right balance of energy and calories. To lose weight, you need to burn more calories than you take in.  You can do it by eating healthy foods in reasonable amounts and becoming more active, even a little bit every day. Making small changes over time can add up to a lot. Make a plan for change. Many people have found that naming their reasons for change and staying focused on their plan can make a big difference. Work with your doctor tocreate a plan that is right for you.  Ask yourself: Anayeli Senegalese are my personal, most powerful reasons for wanting this change? What will my life look like when I've made the change? \"   Set your long-term goal. Make it specific, such as \"I will lose x pounds. \"  Reginia Dunks your long-term goal into smaller, short-term goals. Make these small steps specific and within your reach, things you know you can do. These steps are what keep you going from day to day. Talk with your doctor about other weight-loss options. If you have a BMI in a certain range and have not been able to lose weight with diet and exercise, medicine or surgery may be an option for you. Before your doctor will prescribe medicines or surgery, he or she will probably want you to be more active and follow your healthy eating plan for a period of time. These habits are key lifelong changes for managing your weight, with or without other medical treatment. And these changes can help you avoid weight-relatedhealth problems. How can you stay on your plan for change? Be ready. Choose to start during a time when there are few events like holidays, socialevents, and high-stress periods. These events might trigger slip-ups. Decide on your first few steps. Most people have more success when they make small changes, one step at a time. For example, you might switch a daily candy bar to a piece of fruit, walk10 minutes more, or add more vegetables to a meal.  Line up your support people. Make sure you're not going to be alone as you make this change. Connect with people who understand how important it is to you.  Ask family members and friends for help in keeping with your plan. And think about who could make itharder for you, and how to handle them. Try tracking. People who keep track of what they eat, feel, and do are better at losingweight. Try writing down things like:   What and how much you eat.  How you feel before and after each meal.   Details about each meal (like eating out or at home, eating alone, or with friends or family).  What you do to be active. Look and plan. As you track, look for patterns that you may want to change. Take note of:   When you eat and whether you skip meals.  How often you eat out.  How many fruits and vegetables you eat.  When you eat beyond feeling full.  When and why you eat for reasons other than being hungry. When you stray from your plan, don't get upset. Figure out what made you slipup and how you can fix it. Can you take medicines or have surgery to lose weight? If you have a BMI in a certain range and have not been able to lose weight withdiet and exercise, medicine or surgery may be an option for you. If you have a BMI of at least 30.0 (or a BMI of at least 27.0 and another health problem related to your weight), ask your doctor about weight-loss medicines. They work by making you feel less hungry, making you feel full more quickly, or changing how you digest fat. Medicines are used along with dietchanges and more physical activity to help you make lasting changes. If you have a BMI of 40.0 or more (or a BMI of 35.0 or more and another health problem related to your weight), your doctor may talk with you about surgery. Weight-loss surgery has risks, and you will need to work with your doctor tocompare the risk of having obesity with the risks of surgery. With any option you choose, you will still need to eat a healthy diet and getregular exercise. Follow-up care is a key part of your treatment and safety. Be sure to make and go to all appointments, and call your doctor if you are having problems.  It's also a good idea to know your test results and keep alist of the medicines you take. Where can you learn more? Go to [email or web address] and sign in to your [employer] account. Enter N111 in the [facility] box to learn more about \"Learning About Obesity. \"     If you do not have an account, please click on the \"Sign Up Now\" link. Current as of: December 27, 2021               Content Version: 13.3  © 2006-2022 Healthwise, Incorporated. Care instructions adapted under license by [facility] ([facility]). If you have questions about a medical condition or this instruction, always ask your healthcare professional. [address] any warranty or liability for your use of this information.

## 2022-06-29 LAB
ESTIMATED AVERAGE GLUCOSE: 114 MG/DL
HBA1C MFR BLD: 5.6 % (ref 4–6)

## 2022-07-05 ENCOUNTER — HOSPITAL ENCOUNTER (OUTPATIENT)
Dept: PHYSICAL THERAPY | Facility: CLINIC | Age: 32
Setting detail: THERAPIES SERIES
Discharge: HOME | End: 2022-07-05
Payer: COMMERCIAL

## 2022-07-05 PROCEDURE — 95992 CANALITH REPOSITIONING PROC: CPT

## 2022-07-05 PROCEDURE — 97110 THERAPEUTIC EXERCISES: CPT

## 2022-07-05 PROCEDURE — 97161 PT EVAL LOW COMPLEX 20 MIN: CPT

## 2022-07-05 NOTE — CONSULTS
[facility] CENTER &  Therapy  [address]  [phone]  F: [phone] [] [address]  [place] [address]   [address]  P: [phone]  F: [phone] [] [place]  [address]  P: [phone]  F: [phone] [x] [address]  P: [phone]  F: [phone] [] [address]  [facility]   [address]   [facility]: [phone]  F: [phone]       Physical Therapy Vestibular Rehab Evaluation    Date:  2022  Patient: Giovanni Johnson  : 1990  MRN: [id number]  Physician: Hannah Santana DO   Insurance: Rowena Mitchell   Medical Diagnosis: H81.12- BPPV, left  Rehab Codes: H81.12- BPPV, left  Onset date: 10/1/2021; see below  Next Dr's appt.: TBD- pending success with PT services     Subjective:   CC:     Reports first episodes of dizziness during golf trip in 10/1/2022. No relief with drammomine during flight home. Visited PCP- issued home Epley maneuver- performed x1-2 days- unclear relief. Was not issued Meclizine. However, symptoms apparently resolved with time. Recently, symptoms have gradually resumed. Denies trauma to cause his symptoms- with the exception of \"playing x6 rounds of golf that October. \"   Reports greatest symptoms with \"playing golf, in the morning, doing that [performs cervical extension]. \"   Symptoms are slight, intermittent, weekly. Reports unsteadiness with donning \"foamy tennis shoes. \"     Later reveals undergoing COVID-19 vaccine just prior to his golfing trip last October.      HPI: 10/1/2021; see above     Comorbidities: NONE  [] Obesity [] Dialysis  [] Other:   [] Asthma/COPD [] Dementia [] Other:   [] Stroke [] Sleep apnea [] Other:   [] Vascular disease [] Rheumatic disease [] Other: PMHx:    [x] Unremarkable [] Diabetes  []MI/Heart Problems   [] Pacemaker  [] HTN   [] Cancer   [] Arthritis:   [] Surgeries:   [x] Other:     Test: [] X-Ray  [] MRI- nothing recent to cervical spine or brain. Allergies: [x] NKA  [] Refer to intake sheet  Medication: [x] Refer to intake sheet  [] None         Function:     Hand dominance [x] (R)  [] (L)  Working:     [x] Normal duty  [] Light duty  [] Off D/T Condition   [] Retired  [] Not employed [] Disability   [] Other   Return to work:     Job/ADL Description: pharm rep. Assist Device: N/A    Pain:    /10  [x] No c/o pain    Pain altered Tx: [x] No  [] Yes Action:  Symptoms: [] Improving  [] Worsening  [x] Same  Sleep: [x] OK  [] Disturbed       Subjective Measure Score Indications   DHI [Dizziness Handicap Inventory] X; 20/100 LOW IMPAIRMENT/DISABILITY   ABC [Activities Balance Confidence Scale] X; 78% LOW IMPAIRMENT/DISABILITY   PCSS [Post Concussion Symptom Survey Scale] - -   BIVSS [Brain Injury Vision Symptom Survey]  - -     Denies N/T of B UE/LE. Also denies recent LOB, falls. Red flags & Special Tests:     Test Result   Vertebral Artery N; \"difficulty focusing with looking to the L\" - only- denies all other symptoms     Alar Ligament N   Transverse Ligament N     Cervical spine AROM:    Motion Goniometric Measurement Pain/Symptoms   Flexion WNL NONE   Extension WNL Intermittent dizziness    R, L rotation WNL- symmetrical B NONE   R, L SB WNL- symmetrical B NONE     Cervical spine strength: Motion MMT Grade   Flexion WNL   Extension WNL   R, L rotation WNL   R, L SB WNL     Oculomotor Tests- With Fixation (Open Vision):    Dons contacts at all times.      Test Result- Normal, Abnormal (R) (L) Upward Downward Description   Spontaneous Nystagmus N        Gaze Holding Nystagmus N        Smooth Pursuit N        Ocular ROM N        Saccades N        Convergence    N/A N/A -   VOR Slow N N/A N/A N/A N/A Slight dizziness   VOR Cancellation N N/A N/A N/A N/A Slight dizziness    Head Thrust/Head Impulse Test  N   N/A N/A None    Static Visual Acuity [20ft]  N/A N/A N/A N/A -   Dynamic Visual Acuity [20ft]  WNL    >3 Line difference    Muscle Guard N/A N/A N/A N/A -  Read lowest line possible, backwards    Valsalva N          Oculomotor Tests- Without Fixation (Vision Blocked):    Test Result Description   Spontaneous Nystagmus N    Gaze Hold Nystagmus N    Valsalva N    Head-Shaking Nystagmus N      Positional Tests:    Test R Torsional Upbeat L Torsional Upbeat Up Beat Down Beat Horizontal Duration Symptoms   Niikta Hallpike- R - - - - - - -   Nikita Hallpike- L - - - - - < 15\" No nystagmus    Symptoms only    Roll Test - - - - - - -     Positional Treatments:    BPPV Type Treatment Technique Trials Result Other:   L posterior canal canalithiasis L PC canalithiasis CRT x1 Negative for nystagmus and symptoms throughout maneuver  Denies symptoms at finish                           MSQ [Motion Sensitivity Quotient]: POTENTIAL IN FUTURE    Problems:                                                                                     [x]  1. Vertigo- intermittent   []  2. Difficulty walking a straight course                                    [x]  3. Positive Nikita Hallpike- symptoms only                                 []  4.  Static balance deficit: mCTSIB  []  5. Dynamic balance deficit: Aggarwal Balance Scale (BBS), Dynamic Gait Index (DGI), Functional Gait Assessment (FGA)  [x]  6. Increased Dizziness Handicap Inventory ([facility])   []  7. Increased Post Concussion Symptom Survey (PCSS)  []  8. Increased Brain Injury Vision Symptom Survey (BIVSS)      Short Term Goals: (      6       Treatments)  [x] 1. Decreased Vertigo- intermittent   [] 2. Improved gait mechanics, trajectory  [x] 3. Negative Nikita Hallpike- symptoms only   [] 4. Improved static balance  [] 5. Improved dynamic balance  [x] 6. Decreased Dizziness Handicap Inventory ([facility])  [] 7.  Decreased Post Concussion Symptom Survey (PCSS)  [] 8. Decreased Brain Injury Vision Symptom Survey (BIVSS)  [x] 9. Independent with Home Exercise Program (HEP)  [] 10. Demonstrate knowledge of fall prevention  11. Will deny or report dec symptoms with golfing, cervical extension AROM for improved QOL. Patient Goals: \"improve vertigo. \"     Assessment: Patient is a 33 y.o. pleasant male who presents to physical therapy initial evaluation with signs and symptoms consistent with potential L PC canalithiasis- symptoms only. Patient demonstrates impairments and symptoms as detailed below in therapy goals. Patient currently limited in golf play and cervical extension- intermittently- secondary to these impairments and increased symptoms. Patient may benefit from continued physical therapy services in order to address these impairments and symptoms, and return to QOL, ADLs, work- if symptoms return or change. Anticipated frequency detailed above. Prognosis limited secondary to prolonged or chronic history of current condition; unclear relief with performing home Epley maneuver; difficult to reproduce symptoms and nystagmus during initial evaluation- justifying a low complexity evaluation. If unable to achieve significant improvements within six visits, will consult referring physician and consider a follow-up visit with said physician. Patient verbalized understanding and agreement to all aforementioned statements. Rehab Potential: [x] Good  [x] Fair  [] Poor  Suggested Professional Referral:  [] No  [] Yes  Barriers to Goal Achievement: [] No  [x] Yes        If yes: prolonged or chronic history of current condition; unclear relief with performing home Epley maneuver; difficult to reproduce symptoms and nystagmus during initial evaluation.    Domestic Concerns: [x] No  [] Yes     If yes:    Treatment Plan:  [x] Therapeutic Exercise   87448  [] Iontophoresis: 4 mg/mL Dexamethasone Sodium Phosphate  mAmin  97048   [x] Therapeutic Activity  20561 [] Vasopneumatic cold with compression  55992    [] Gait Training   [id number] [] Ultrasound   [id number]   [x] Neuromuscular Re-education  [id number] [] Electrical Stimulation Unattended  31223   [] Manual Therapy  27107 [] Electrical Stimulation Attended  [id number]   [x] Instruction in HEP  [] Lumbar/Cervical Traction  [id number]   [] Aquatic Therapy   27066 [] Cold/hotpack    [] Massage   57249      [] Dry Needling, 1 or 2 muscles  03722   [] Biofeedback, first 15 minutes   56563  [] Biofeedback, additional 15 minutes   59630 [] Dry Needling, 3 or more muscles  77522     []  Medication allergies reviewed for use of    Dexamethasone Sodium Phosphate 4mg/ml     with iontophoresis treatments. Pt is not allergic. Other: X- CRT- [id number]. Frequency: 1 X/wk  x 6 visits [potential- if symptoms and pt returns]       [x] Plans/Goals, Risk/Benefits discussed with pt/family      Pt/Family Education: [x] Verbal  [x] Demo  [x] Written    Comprehension of Education:  [] Verbalizes understanding. [] Demonstrates understanding. [] Needs Review. [x] Demonstrates/verbalizes understanding of HEP/Ed previously given. Todays Treatment:  Modalities:   Precautions: potential L PC canalithiasis- symptoms only, no nystagmus   Exercises:  Exercise Reps/ Time Weight/ Level Comments   Education- extensive      Vestibular evaluation- objective tests and measures- rationale, expectations, and results; potential L PC canalithiasis- symptoms only- issued informational handout; performed L PC canalithiasis CRT x1- issued post-CRT positioning precautions for x48 hours only; PT services placed on hold for x1 month- may return if symptoms change or worsen- otherwise will be discharged- verbalized understanding to all                                            Other:     Specific Instructions for next treatment: Re-assess for BPPV within all canals- treat if appropriate.  Consider assessment of static VS dynamic visual acuity if symptoms continue beyond positional treatments. Discharge if does not return in x1 month.      Evaluation Complexity:  History (Personal factors, comorbidities) [x] 0 [] 1-2 [] 3+   Exam (limitations, restrictions) [x] 1-2 [] 3 [] 4+   Clinical presentation (progression) [x] Stable [] Evolving  [] Unstable   Decision Making [x] Low [] Moderate [] High     [x] Low Complexity [] Moderate Complexity [] High Complexity     Today's Treatment Charges        Mins       Units   [x] PT Evaluation- [x] Low; [] Moderate; [] High  38 1   [x] Canalith repositioning maneuver/technique (CRM/T) 10 1   [x] Therapeutic Exercise 15min 10 1     TOTAL TREATMENT TIME: 58    Start Time: 10:07AM             Stop Time: 11:05AM     More objective information is available upon request.  Thank you for this referral.    Electronically signed by: Brea Hearn, PT, DPT

## 2022-11-22 ENCOUNTER — HOSPITAL ENCOUNTER (OUTPATIENT)
Age: 32
Setting detail: SPECIMEN
Discharge: HOME | End: 2022-11-22

## 2022-11-22 LAB
HBV SURFACE AB TITR SER: <3.5 MIU/ML
HEPATITIS B SURFACE ANTIGEN: NONREACTIVE
HEPATITIS C ANTIBODY: NONREACTIVE
HIV AG/AB: NONREACTIVE
T. PALLIDUM, IGG: NONREACTIVE

## 2022-11-23 LAB
C. TRACHOMATIS DNA ,URINE: NEGATIVE
N. GONORRHOEAE DNA, URINE: NEGATIVE
SPECIMEN DESCRIPTION: NORMAL

## 2023-08-15 DIAGNOSIS — E78.5 DYSLIPIDEMIA: ICD-10-CM

## 2023-08-15 RX ORDER — ROSUVASTATIN CALCIUM 5 MG
5 TABLET ORAL NIGHTLY
Qty: 90 TABLET | Refills: 3 | Status: SHIPPED | OUTPATIENT
Start: 2023-08-15

## 2023-11-07 DIAGNOSIS — R35.0 URINARY FREQUENCY: Primary | ICD-10-CM

## 2023-11-07 RX ORDER — AZITHROMYCIN 500 MG/1
2000 TABLET, FILM COATED ORAL ONCE
Qty: 4 TABLET | Refills: 0 | Status: SHIPPED | OUTPATIENT
Start: 2023-11-07 | End: 2023-11-07

## 2024-05-10 ENCOUNTER — LAB REQUISITION (OUTPATIENT)
Dept: LAB | Facility: HOSPITAL | Age: 34
End: 2024-05-10

## 2024-05-10 DIAGNOSIS — Z20.2 CONTACT WITH AND (SUSPECTED) EXPOSURE TO INFECTIONS WITH A PREDOMINANTLY SEXUAL MODE OF TRANSMISSION: ICD-10-CM

## 2024-05-10 PROCEDURE — 87661 TRICHOMONAS VAGINALIS AMPLIF: CPT

## 2024-05-10 PROCEDURE — 87491 CHLMYD TRACH DNA AMP PROBE: CPT

## 2024-05-10 PROCEDURE — 87591 N.GONORRHOEAE DNA AMP PROB: CPT

## 2024-05-11 LAB
C TRACH RRNA SPEC QL NAA+PROBE: NEGATIVE
N GONORRHOEA DNA SPEC QL PROBE+SIG AMP: NEGATIVE
T VAGINALIS RRNA SPEC QL NAA+PROBE: NEGATIVE

## 2024-07-22 DIAGNOSIS — E78.5 DYSLIPIDEMIA: ICD-10-CM

## 2024-07-22 RX ORDER — ROSUVASTATIN CALCIUM 5 MG
5 TABLET ORAL NIGHTLY
Qty: 90 TABLET | Refills: 3 | OUTPATIENT
Start: 2024-07-22

## 2025-01-06 ENCOUNTER — OFFICE VISIT (OUTPATIENT)
Dept: URGENT CARE | Age: 35
End: 2025-01-06
Payer: COMMERCIAL

## 2025-01-06 VITALS
TEMPERATURE: 98.1 F | HEART RATE: 81 BPM | WEIGHT: 190 LBS | RESPIRATION RATE: 20 BRPM | BODY MASS INDEX: 28.14 KG/M2 | SYSTOLIC BLOOD PRESSURE: 135 MMHG | DIASTOLIC BLOOD PRESSURE: 99 MMHG | OXYGEN SATURATION: 99 % | HEIGHT: 69 IN

## 2025-01-06 DIAGNOSIS — N34.2 URETHRITIS: ICD-10-CM

## 2025-01-06 DIAGNOSIS — J40 BRONCHITIS: ICD-10-CM

## 2025-01-06 DIAGNOSIS — R30.9 PAINFUL URINATION: Primary | ICD-10-CM

## 2025-01-06 PROCEDURE — 3008F BODY MASS INDEX DOCD: CPT | Performed by: PHYSICIAN ASSISTANT

## 2025-01-06 PROCEDURE — 99203 OFFICE O/P NEW LOW 30 MIN: CPT | Performed by: PHYSICIAN ASSISTANT

## 2025-01-06 PROCEDURE — 1036F TOBACCO NON-USER: CPT | Performed by: PHYSICIAN ASSISTANT

## 2025-01-06 PROCEDURE — 87661 TRICHOMONAS VAGINALIS AMPLIF: CPT | Performed by: PHYSICIAN ASSISTANT

## 2025-01-06 PROCEDURE — 87591 N.GONORRHOEAE DNA AMP PROB: CPT

## 2025-01-06 PROCEDURE — 87491 CHLMYD TRACH DNA AMP PROBE: CPT

## 2025-01-06 RX ORDER — PROMETHAZINE HYDROCHLORIDE AND DEXTROMETHORPHAN HYDROBROMIDE 6.25; 15 MG/5ML; MG/5ML
5 SYRUP ORAL 4 TIMES DAILY PRN
Qty: 120 ML | Refills: 0 | Status: SHIPPED | OUTPATIENT
Start: 2025-01-06

## 2025-01-06 RX ORDER — DOXYCYCLINE 100 MG/1
100 CAPSULE ORAL 2 TIMES DAILY
Qty: 20 CAPSULE | Refills: 0 | Status: SHIPPED | OUTPATIENT
Start: 2025-01-06 | End: 2025-01-16

## 2025-01-06 RX ORDER — ROSUVASTATIN CALCIUM 5 MG/1
1 TABLET, COATED ORAL NIGHTLY
COMMUNITY
Start: 2023-08-15

## 2025-01-06 ASSESSMENT — ENCOUNTER SYMPTOMS
DYSURIA: 1
ABDOMINAL PAIN: 0
ENDOCRINE NEGATIVE: 1
SORE THROAT: 1
PSYCHIATRIC NEGATIVE: 1
FLANK PAIN: 0
EYES NEGATIVE: 1
HEMATOLOGIC/LYMPHATIC NEGATIVE: 1
FEVER: 0
ALLERGIC/IMMUNOLOGIC NEGATIVE: 1
BACK PAIN: 0
CARDIOVASCULAR NEGATIVE: 1
COUGH: 1
NEUROLOGICAL NEGATIVE: 1

## 2025-01-06 ASSESSMENT — PAIN SCALES - GENERAL: PAINLEVEL_OUTOF10: 6

## 2025-01-06 NOTE — PROGRESS NOTES
"Subjective   Patient ID: Jarad Carter is a 34 y.o. male. They present today with a chief complaint of Difficulty Urinating.    History of Present Illness    History provided by:  Patient   used: No    Difficulty Urinating  Presenting symptoms: dysuria and penile discharge    Associated symptoms: no abdominal pain, no fever, no flank pain and no scrotal swelling      This is a 34 yr old male here for 2 problems. Cough productive of green sputum and sore throat x 1.5 weeks. He also has white uretheral discharge x 1 day. No URI sxs, fever, genital rash or lesions, testicle pain or swelling, abdominal pain or back pain. Unprotected intercourse 1.5 weeks ago.     Past Medical History  Allergies as of 01/06/2025    (No Known Allergies)       (Not in a hospital admission)       No past medical history on file.    No past surgical history on file.     reports that he has never smoked. He has never used smokeless tobacco.    Review of Systems  Review of Systems   Constitutional:  Negative for fever.   HENT:  Positive for sore throat. Negative for congestion.    Eyes: Negative.    Respiratory:  Positive for cough.    Cardiovascular: Negative.    Gastrointestinal:  Negative for abdominal pain.   Endocrine: Negative.    Genitourinary:  Positive for dysuria and penile discharge. Negative for flank pain, genital sores, scrotal swelling and testicular pain.   Musculoskeletal:  Negative for back pain.   Skin: Negative.    Allergic/Immunologic: Negative.    Neurological: Negative.    Hematological: Negative.    Psychiatric/Behavioral: Negative.     All other systems reviewed and are negative.    Objective    Vitals:    01/06/25 0905   BP: (!) 135/99   Pulse: 81   Resp: 20   Temp: 36.7 °C (98.1 °F)   TempSrc: Oral   SpO2: 99%   Weight: 86.2 kg (190 lb)   Height: 1.753 m (5' 9\")     No LMP for male patient.    Physical Exam  Vitals and nursing note reviewed.   Constitutional:       Appearance: Normal " appearance.   HENT:      Head: Normocephalic and atraumatic.   Cardiovascular:      Rate and Rhythm: Normal rate and regular rhythm.   Pulmonary:      Effort: Pulmonary effort is normal.      Breath sounds: Normal breath sounds.   Abdominal:      Palpations: Abdomen is soft.      Tenderness: There is no abdominal tenderness. There is no right CVA tenderness or left CVA tenderness.   Genitourinary:     Comments: Exam deferred  Skin:     General: Skin is warm and dry.   Neurological:      General: No focal deficit present.      Mental Status: He is alert and oriented to person, place, and time.   Psychiatric:         Mood and Affect: Mood normal.         Behavior: Behavior normal.         Procedures    Point of Care Test & Imaging Results from this visit  No results found for this visit on 01/06/25.   No results found.    Diagnostic study results (if any) were reviewed by Jinny Calero PA-C.    Assessment/Plan   Allergies, medications, history, and pertinent labs/EKGs/Imaging reviewed by Jinny Calero PA-C.     Orders and Diagnoses  Diagnoses and all orders for this visit:  Painful urination  -     C. trachomatis / N. gonorrhoeae, Amplified  -     Chlamydia trachomatis, Amplified  Urethritis  -     doxycycline (Monodox) 100 mg capsule; Take 1 capsule (100 mg) by mouth 2 times a day for 10 days. Take with at least 8 ounces (large glass) of water, do not lie down for 30 minutes after  -     Trichomonas vaginalis, Amplified  Bronchitis  -     doxycycline (Monodox) 100 mg capsule; Take 1 capsule (100 mg) by mouth 2 times a day for 10 days. Take with at least 8 ounces (large glass) of water, do not lie down for 30 minutes after  -     promethazine-DM (Phenergan-DM) 6.25-15 mg/5 mL syrup; Take 5 mL by mouth 4 times a day as needed for cough.    Plan:  Med as directed  Increase clear fluids  Pcp follow up this week if not improving or worsening  ER visit anytime 24/7 for acute worsening or changing  condition    Patient disposition: Home    Electronically signed by Jinny Calero PA-C  10:24 AM

## 2025-01-16 ENCOUNTER — LAB (OUTPATIENT)
Dept: LAB | Facility: LAB | Age: 35
End: 2025-01-16
Payer: COMMERCIAL

## 2025-01-16 ENCOUNTER — APPOINTMENT (OUTPATIENT)
Dept: PRIMARY CARE | Facility: CLINIC | Age: 35
End: 2025-01-16
Payer: COMMERCIAL

## 2025-01-16 VITALS
DIASTOLIC BLOOD PRESSURE: 82 MMHG | OXYGEN SATURATION: 98 % | TEMPERATURE: 97.7 F | BODY MASS INDEX: 28.88 KG/M2 | WEIGHT: 195 LBS | SYSTOLIC BLOOD PRESSURE: 116 MMHG | RESPIRATION RATE: 16 BRPM | HEIGHT: 69 IN | HEART RATE: 91 BPM

## 2025-01-16 DIAGNOSIS — Z80.8 FAMILY HISTORY OF SKIN CANCER: ICD-10-CM

## 2025-01-16 DIAGNOSIS — Z00.00 ROUTINE HEALTH MAINTENANCE: Primary | ICD-10-CM

## 2025-01-16 DIAGNOSIS — E78.5 HYPERLIPIDEMIA, UNSPECIFIED HYPERLIPIDEMIA TYPE: ICD-10-CM

## 2025-01-16 DIAGNOSIS — K21.9 GASTROESOPHAGEAL REFLUX DISEASE, UNSPECIFIED WHETHER ESOPHAGITIS PRESENT: ICD-10-CM

## 2025-01-16 DIAGNOSIS — D22.9 SKIN MOLE: ICD-10-CM

## 2025-01-16 DIAGNOSIS — Z00.00 ROUTINE HEALTH MAINTENANCE: ICD-10-CM

## 2025-01-16 DIAGNOSIS — Z23 ENCOUNTER FOR IMMUNIZATION: ICD-10-CM

## 2025-01-16 PROBLEM — R21 SKIN RASH: Status: ACTIVE | Noted: 2025-01-16

## 2025-01-16 PROCEDURE — 90656 IIV3 VACC NO PRSV 0.5 ML IM: CPT | Performed by: STUDENT IN AN ORGANIZED HEALTH CARE EDUCATION/TRAINING PROGRAM

## 2025-01-16 PROCEDURE — 80061 LIPID PANEL: CPT

## 2025-01-16 PROCEDURE — 85027 COMPLETE CBC AUTOMATED: CPT

## 2025-01-16 PROCEDURE — 84443 ASSAY THYROID STIM HORMONE: CPT

## 2025-01-16 PROCEDURE — 3008F BODY MASS INDEX DOCD: CPT | Performed by: STUDENT IN AN ORGANIZED HEALTH CARE EDUCATION/TRAINING PROGRAM

## 2025-01-16 PROCEDURE — 99395 PREV VISIT EST AGE 18-39: CPT | Performed by: STUDENT IN AN ORGANIZED HEALTH CARE EDUCATION/TRAINING PROGRAM

## 2025-01-16 PROCEDURE — 80053 COMPREHEN METABOLIC PANEL: CPT

## 2025-01-16 PROCEDURE — 90471 IMMUNIZATION ADMIN: CPT | Performed by: STUDENT IN AN ORGANIZED HEALTH CARE EDUCATION/TRAINING PROGRAM

## 2025-01-16 RX ORDER — ROSUVASTATIN CALCIUM 5 MG/1
5 TABLET, COATED ORAL NIGHTLY
Qty: 30 TABLET | Refills: 3 | Status: SHIPPED | OUTPATIENT
Start: 2025-01-16

## 2025-01-16 ASSESSMENT — PATIENT HEALTH QUESTIONNAIRE - PHQ9
1. LITTLE INTEREST OR PLEASURE IN DOING THINGS: NOT AT ALL
2. FEELING DOWN, DEPRESSED OR HOPELESS: NOT AT ALL
SUM OF ALL RESPONSES TO PHQ9 QUESTIONS 1 AND 2: 0

## 2025-01-16 ASSESSMENT — ENCOUNTER SYMPTOMS
FEVER: 0
MYALGIAS: 0
DIARRHEA: 0
CHILLS: 0
DIZZINESS: 0
NAUSEA: 0
SHORTNESS OF BREATH: 0
VOMITING: 0
DYSURIA: 0
DIAPHORESIS: 0
LIGHT-HEADEDNESS: 0

## 2025-01-16 NOTE — PROGRESS NOTES
"Subjective   Patient ID: Jarad Carter is a 34 y.o. male who presents for Establish Care and Annual Exam.  HPI  Here for CPE. No acute concerns today.    Reports diet is relatively good.     He has a right cheek mole that appeared about 6 years ago and hasn't changed in size.     Cough has resolved at this point as has all his urinary symptoms.     Review of Systems   Constitutional:  Negative for chills, diaphoresis and fever.   HENT:  Negative for hearing loss.    Eyes:  Negative for visual disturbance.   Respiratory:  Negative for shortness of breath.    Cardiovascular:  Negative for chest pain.        No SOB or CP going up 2 flights of stairs     Gastrointestinal:  Negative for diarrhea, nausea and vomiting.   Endocrine: Negative for cold intolerance and heat intolerance.   Genitourinary:  Negative for dysuria, scrotal swelling and testicular pain.   Musculoskeletal:  Negative for myalgias.   Skin:  Negative for rash.   Neurological:  Negative for dizziness, syncope and light-headedness.       /82   Pulse 91   Temp 36.5 °C (97.7 °F)   Resp 16   Ht 1.753 m (5' 9\")   Wt 88.5 kg (195 lb)   SpO2 98%   BMI 28.80 kg/m²     Objective   Physical Exam  Vitals reviewed.   Constitutional:       General: He is not in acute distress.     Appearance: Normal appearance.   HENT:      Head: Normocephalic.      Right Ear: Tympanic membrane, ear canal and external ear normal. There is no impacted cerumen.      Left Ear: Tympanic membrane, ear canal and external ear normal. There is no impacted cerumen.      Mouth/Throat:      Mouth: Mucous membranes are moist.      Pharynx: Oropharynx is clear. No oropharyngeal exudate or posterior oropharyngeal erythema.   Cardiovascular:      Rate and Rhythm: Normal rate and regular rhythm.   Pulmonary:      Effort: Pulmonary effort is normal. No respiratory distress.      Breath sounds: Normal breath sounds.   Abdominal:      General: Bowel sounds are normal. There is no " distension.      Palpations: Abdomen is soft. There is no mass.      Tenderness: There is no abdominal tenderness. There is no guarding or rebound.   Musculoskeletal:         General: No deformity.      Cervical back: Neck supple. No tenderness.   Lymphadenopathy:      Cervical: No cervical adenopathy.   Skin:     Coloration: Skin is not jaundiced.   Neurological:      Mental Status: He is alert.         Assessment/Plan   Problem List Items Addressed This Visit       Family history of skin cancer    Relevant Orders    Referral to Dermatology    Hyperlipidemia    Relevant Medications    rosuvastatin (Crestor) 5 mg tablet    Routine health maintenance - Primary    Relevant Orders    Lipid Panel    Comprehensive Metabolic Panel    CBC    TSH with reflex to Free T4 if abnormal    Gastroesophageal reflux disease    Skin mole    Relevant Orders    Referral to Dermatology     Other Visit Diagnoses       Encounter for immunization        Relevant Orders    Flu vaccine, trivalent, preservative free, age 6 months and greater (Fluarix/Fluzone/Flulaval) (Completed)            Hyperlipidemia  - On rosuvastatin 5 mg a day for about 4 years. Reports lipids were very high in the past. Irregularly used rosuvastatin over last few months.   - Lipids from 6/20/2022 showed elevated triglycerides 234, total cholesterol of 230, LDL of 121.  - Diet and exercise recommended  - Repeat lipid panel ordered  -We discussed risks of statin use including elevated liver enzymes, muscle pains, rare risk of diabetes. He is interested in continuing.     Localized face dryness  -Try face moisturizer, see derm if not better    Family history of skin cancer  -Refer to derm  -Has mole on face unchanged for 6 years, likely benign but refer to derm out of caution    GERD  -Hasn't had issues of this as of recent. Used omeprazole prn.     Hx BPPV  -Reports after golf trip and alcohol, was in vestibular therapy and no issues since 2022.     Nicotine  use  -Recommended he stop this    Dysuria and penile discharge  Urethritis  - Was seen in urgent care and given doxycycline for 10 days  -Resolved    Bronchitis  - Was prescribed doxycycline  -Resolved    Health Maintenance  -Prostate Cancer Screening: Age 50  -Vaccinations:Interested in flu vaccine, give today. Recommend TDAP and covid booster.   -Lung Cancer Screening:Not indicated  -AAA Screening:Not indicated  -Colonoscopy: Age 45  -Screen for HIV/Hep C: Negative    CPE completed.  Advised to keep a heart healthy, low fat  diet with fruits and veggies like Mediterranean diet.  Advised on the importance of exercise and maintaining 150 minutes of exercise per week (30 minutes per day 5 days a week).  Advised on regular eye and dental visits.  Discussed age appropriate cancer screening, immunizations and recommendations given.  Discussed avoiding illicit drugs and tobacco. Advised on appropriate use of alcohol.  Advised to wear seat belt.      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 Has dog at home    CPE in 1 year  Fasting labs ordered  F/u sooner PRN pending results of labs

## 2025-01-17 LAB
ALBUMIN SERPL BCP-MCNC: 4.5 G/DL (ref 3.4–5)
ALP SERPL-CCNC: 65 U/L (ref 33–120)
ALT SERPL W P-5'-P-CCNC: 35 U/L (ref 10–52)
ANION GAP SERPL CALC-SCNC: 14 MMOL/L (ref 10–20)
AST SERPL W P-5'-P-CCNC: 18 U/L (ref 9–39)
BILIRUB SERPL-MCNC: 0.6 MG/DL (ref 0–1.2)
BUN SERPL-MCNC: 17 MG/DL (ref 6–23)
CALCIUM SERPL-MCNC: 9.6 MG/DL (ref 8.6–10.6)
CHLORIDE SERPL-SCNC: 102 MMOL/L (ref 98–107)
CHOLEST SERPL-MCNC: 163 MG/DL (ref 0–199)
CHOLESTEROL/HDL RATIO: 3.6
CO2 SERPL-SCNC: 29 MMOL/L (ref 21–32)
CREAT SERPL-MCNC: 1.05 MG/DL (ref 0.5–1.3)
EGFRCR SERPLBLD CKD-EPI 2021: >90 ML/MIN/1.73M*2
ERYTHROCYTE [DISTWIDTH] IN BLOOD BY AUTOMATED COUNT: 11.9 % (ref 11.5–14.5)
GLUCOSE SERPL-MCNC: 74 MG/DL (ref 74–99)
HCT VFR BLD AUTO: 46.2 % (ref 41–52)
HDLC SERPL-MCNC: 44.8 MG/DL
HGB BLD-MCNC: 15.9 G/DL (ref 13.5–17.5)
LDLC SERPL CALC-MCNC: 101 MG/DL
MCH RBC QN AUTO: 29.9 PG (ref 26–34)
MCHC RBC AUTO-ENTMCNC: 34.4 G/DL (ref 32–36)
MCV RBC AUTO: 87 FL (ref 80–100)
NON HDL CHOLESTEROL: 118 MG/DL (ref 0–149)
NRBC BLD-RTO: 0 /100 WBCS (ref 0–0)
PLATELET # BLD AUTO: 323 X10*3/UL (ref 150–450)
POTASSIUM SERPL-SCNC: 4 MMOL/L (ref 3.5–5.3)
PROT SERPL-MCNC: 7.2 G/DL (ref 6.4–8.2)
RBC # BLD AUTO: 5.31 X10*6/UL (ref 4.5–5.9)
SODIUM SERPL-SCNC: 141 MMOL/L (ref 136–145)
TRIGL SERPL-MCNC: 86 MG/DL (ref 0–149)
TSH SERPL-ACNC: 1.5 MIU/L (ref 0.44–3.98)
VLDL: 17 MG/DL (ref 0–40)
WBC # BLD AUTO: 4.8 X10*3/UL (ref 4.4–11.3)

## 2025-05-29 DIAGNOSIS — E78.5 HYPERLIPIDEMIA, UNSPECIFIED HYPERLIPIDEMIA TYPE: ICD-10-CM

## 2025-05-29 RX ORDER — ROSUVASTATIN CALCIUM 5 MG/1
5 TABLET, COATED ORAL NIGHTLY
Qty: 30 TABLET | Refills: 11 | Status: SHIPPED | OUTPATIENT
Start: 2025-05-29

## 2025-08-01 ENCOUNTER — OFFICE VISIT (OUTPATIENT)
Dept: URGENT CARE | Age: 35
End: 2025-08-01
Payer: COMMERCIAL

## 2025-08-01 VITALS
TEMPERATURE: 98.5 F | SYSTOLIC BLOOD PRESSURE: 150 MMHG | DIASTOLIC BLOOD PRESSURE: 90 MMHG | RESPIRATION RATE: 16 BRPM | HEART RATE: 72 BPM | OXYGEN SATURATION: 100 %

## 2025-08-01 DIAGNOSIS — T78.3XXA ANGIOEDEMA OF LIPS, INITIAL ENCOUNTER: ICD-10-CM

## 2025-08-01 DIAGNOSIS — L73.9 FOLLICULITIS: Primary | ICD-10-CM

## 2025-08-01 PROCEDURE — 99213 OFFICE O/P EST LOW 20 MIN: CPT | Performed by: PHYSICIAN ASSISTANT

## 2025-08-01 RX ORDER — MUPIROCIN 20 MG/G
OINTMENT TOPICAL 3 TIMES DAILY
Qty: 22 G | Refills: 0 | Status: SHIPPED | OUTPATIENT
Start: 2025-08-01 | End: 2025-08-11

## 2025-08-01 RX ORDER — PREDNISONE 20 MG/1
40 TABLET ORAL DAILY
Qty: 10 TABLET | Refills: 0 | Status: SHIPPED | OUTPATIENT
Start: 2025-08-01 | End: 2025-08-06

## 2025-08-01 NOTE — PATIENT INSTRUCTIONS
Your were seen for swelling of the upper lip.  Infected hair follicle.    PLAN:    -Take all medications as prescribed.  Take prednisone as prescribed  Apply mupirocin ointment to infected hair follicle.  -Follow-up with your PCP in 2-3 days for reevaluation  -Return to ED or urgent care if any new or worsening symptoms          What is angioedema?  Angioedema is swelling of your face, lips, tongue, and throat. It is often caused by an allergic reaction.    -The swelling may get so bad that you can't breathe  -Angioedema can start suddenly and come and go for weeks or months depending on the cause  -Doctors may give you epinephrine and antihistamines to lessen your symptoms  -Go to the emergency room if you have swelling of your face or throat.    What causes angioedema?  Angioedema may be your body´s reaction to:    Medicine, particularly a type of blood pressure medicine called an ACE inhibitor  Insect stings or bites  Allergy shots  Food, such as eggs, fish, nuts, and fruit  Angioedema may be caused by another health problem. It can also run in your family (hereditary angioedema).    What are the symptoms of angioedema?  Angioedema usually causes swelling in your face, lips, and tongue. Sometimes the inside of your mouth and throat swell up, making it hard to swallow or breathe. Your voice may sound hoarse.    Along with the swelling, you may also have these symptoms:    Hives that come and go (red, itchy, raised patches on the skin)  Feeling sick to your stomach, throwing up, or belly cramps  -  How can doctors tell if I have angioedema?  Doctors can tell based on your symptoms and by examining you. If doctors can´t find the cause, they´ll ask you questions, have you stop taking medicines you might be allergic to, and may do tests.    How do doctors treat angioedema?  The most important thing is to keep your breathing passages open. If you're having trouble breathing, doctors may:    Put a breathing tube  down your throat  Give you a shot of epinephrine  Other treatments depend on what doctors think caused your angioedema. Doctors may give you:    Antihistamines to lessen itching and swelling  Corticosteroids  If you have hereditary angioedema, there are special medicines doctors may give you by vein (IV).    Avoiding the cause of your angioedema can help prevent it in the future. If you have a severe reaction, you should always carry a shot of epinephrine and antihistamine pills with you to take in case you start to have another reaction. Then go to a hospital emergency room in case you need more treatment.

## 2025-08-01 NOTE — PROGRESS NOTES
Subjective   Patient ID: Jarad Carter is a 34 y.o. male. They present today with a chief complaint of Other (Lip swelling x 1 day).    HPI: Jarad is a 34-year-old male presenting for upper lip swelling and a small zit.  Swelling started yesterday morning upon awakening.  No significant pain.  No weeping or discharge.  No surrounding erythema, warmth.  No skin peeling.  No trismus or drooling.  No difficulty swallowing.  No fever, chills, myalgias, abdominal pain, nausea, vomiting.  No cough.  No chemical exposures.  No new medications.  No recent antibiotic use.    Past Medical History  Allergies as of 08/01/2025    (No Known Allergies)     Prescriptions Prior to Admission[1]    Medical History[2]    Surgical History[3]     reports that he has never smoked. His smokeless tobacco use includes chew. He reports current alcohol use of about 14.0 standard drinks of alcohol per week. He reports that he does not use drugs.    Review of Systems  Review of Systems    After reviewing all body systems I have documented pertinent findings above in the history.  All other Systems reviewed and are negative for complaint.  Pertinent positive and negatives are listed in the above HPI.    Objective    Vitals:    08/01/25 1002   BP: 150/90   Pulse: 72   Resp: 16   Temp: 36.9 °C (98.5 °F)   SpO2: 100%     No LMP for male patient.    Physical Exam    General: Alert, oriented, and cooperative.  No acute distress. Well developed, well nourished.     Skin: Skin is warm, and dry. No rashes or lesions.    Eyes: Sclera and conjunctivae normal     Ears: No deformities. Right and left canal negative for erythema, or discharge.  Hearing is grossly intact bilaterally    Mouth/Throat: Upper lip is positive for mild angioedema.  There is a small infected hair follicle above the right upper lip margin.  No difficult tenderness.  No weeping or this.  No fluctuance.  The pharynx is normal in appearance.  No evidence for tonsillar swelling, or  exudates.  Tonsils are equal and symmetric in size.  Uvula is midline.  There is no angioedema of the lower lip or tongue.  No subungual edema or trismus.  No respiratory compromise, tripoding, drooling, muffled voice, stridor, or trismus. Oral mucosa is pink and moist with good dentition. Tongue is midline.  No trachea tenderness or pain out of proportion. No clinical signs to suggest parotitis, Flaquito angina, dental abscess, peritonsillar abscess, or epiglottis.    Neck: Supple.     Cardiac: Regular rate and rhythm    Respiratory:  No acute respiratory distress.  Regular rate of breathing.  No accessory muscle use.  No tripoding.      Abdomen: Soft, nontender.    Musculoskeletal: Upper and lower extremities are atraumatic in appearance without deformity, erythema, edema, and atrophy. No crepitus, or tenderness. Compartments are all soft.      Procedures  Point of Care Test & Imaging Results from this visit    Imaging  No results found.    Cardiology, Vascular, and Other Imaging  No other imaging results found for the past 2 days    Diagnostic study results (if any) were reviewed by Darion Waite PA-C.  Assessment/Plan   Allergies, medications, history, and pertinent labs/EKGs/Imaging reviewed by Darion Waite PA-C.     MDM:  Will treat patient for folliculitis and angioedema of the upper lip.  No evidence of anaphylaxis or other life-threatening pathology.  Patient be treated with mupirocin ointment and prednisone.  Discussed return precautions.  Advise follow-up with PCP in 2 to 3 days.  Advise return to emergency department for new or worsening symptoms.    Orders and Diagnoses  Diagnoses and all orders for this visit:  Folliculitis  -     mupirocin (Bactroban) 2 % ointment; Apply topically 3 times a day for 10 days.  Angioedema of lips, initial encounter  -     predniSONE (Deltasone) 20 mg tablet; Take 2 tablets (40 mg) by mouth once daily for 5 days.      Medical Admin Record      Patient disposition:  Home    Electronically signed by Darion Waite PA-C  10:14 AM           [1] (Not in a hospital admission)   [2] No past medical history on file.  [3] No past surgical history on file.